# Patient Record
Sex: FEMALE | HISPANIC OR LATINO | URBAN - METROPOLITAN AREA
[De-identification: names, ages, dates, MRNs, and addresses within clinical notes are randomized per-mention and may not be internally consistent; named-entity substitution may affect disease eponyms.]

---
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
## 2018-03-26 ENCOUNTER — EMERGENCY (EMERGENCY)
Facility: HOSPITAL | Age: 18
LOS: 0 days | Discharge: ANOTHER HOSPITAL | End: 2018-03-26
Attending: EMERGENCY MEDICINE | Admitting: EMERGENCY MEDICINE
Payer: MEDICAID

## 2018-03-26 VITALS
RESPIRATION RATE: 18 BRPM | DIASTOLIC BLOOD PRESSURE: 66 MMHG | WEIGHT: 110.01 LBS | OXYGEN SATURATION: 99 % | HEART RATE: 80 BPM | TEMPERATURE: 99 F | HEIGHT: 65 IN | SYSTOLIC BLOOD PRESSURE: 111 MMHG

## 2018-03-26 VITALS
HEART RATE: 79 BPM | OXYGEN SATURATION: 100 % | RESPIRATION RATE: 19 BRPM | TEMPERATURE: 98 F | SYSTOLIC BLOOD PRESSURE: 111 MMHG | DIASTOLIC BLOOD PRESSURE: 65 MMHG

## 2018-03-26 DIAGNOSIS — R69 ILLNESS, UNSPECIFIED: ICD-10-CM

## 2018-03-26 DIAGNOSIS — R45.851 SUICIDAL IDEATIONS: ICD-10-CM

## 2018-03-26 DIAGNOSIS — F33.2 MAJOR DEPRESSIVE DISORDER, RECURRENT SEVERE WITHOUT PSYCHOTIC FEATURES: ICD-10-CM

## 2018-03-26 DIAGNOSIS — F43.10 POST-TRAUMATIC STRESS DISORDER, UNSPECIFIED: ICD-10-CM

## 2018-03-26 LAB
ALBUMIN SERPL ELPH-MCNC: 4 G/DL — SIGNIFICANT CHANGE UP (ref 3.3–5)
ALP SERPL-CCNC: 80 U/L — SIGNIFICANT CHANGE UP (ref 40–120)
ALT FLD-CCNC: 19 U/L — SIGNIFICANT CHANGE UP (ref 12–78)
AMPHET UR-MCNC: NEGATIVE — SIGNIFICANT CHANGE UP
ANION GAP SERPL CALC-SCNC: 8 MMOL/L — SIGNIFICANT CHANGE UP (ref 5–17)
APAP SERPL-MCNC: < 2 UG/ML (ref 10–30)
APPEARANCE UR: CLEAR — SIGNIFICANT CHANGE UP
AST SERPL-CCNC: 14 U/L — LOW (ref 15–37)
BACTERIA # UR AUTO: (no result)
BARBITURATES UR SCN-MCNC: NEGATIVE — SIGNIFICANT CHANGE UP
BASOPHILS # BLD AUTO: 0.03 K/UL — SIGNIFICANT CHANGE UP (ref 0–0.2)
BASOPHILS NFR BLD AUTO: 0.4 % — SIGNIFICANT CHANGE UP (ref 0–2)
BENZODIAZ UR-MCNC: NEGATIVE — SIGNIFICANT CHANGE UP
BILIRUB SERPL-MCNC: 0.4 MG/DL — SIGNIFICANT CHANGE UP (ref 0.2–1.2)
BILIRUB UR-MCNC: NEGATIVE — SIGNIFICANT CHANGE UP
BUN SERPL-MCNC: 9 MG/DL — SIGNIFICANT CHANGE UP (ref 7–23)
CALCIUM SERPL-MCNC: 9 MG/DL — SIGNIFICANT CHANGE UP (ref 8.5–10.1)
CHLORIDE SERPL-SCNC: 106 MMOL/L — SIGNIFICANT CHANGE UP (ref 96–108)
CO2 SERPL-SCNC: 26 MMOL/L — SIGNIFICANT CHANGE UP (ref 22–31)
COCAINE METAB.OTHER UR-MCNC: NEGATIVE — SIGNIFICANT CHANGE UP
COLOR SPEC: YELLOW — SIGNIFICANT CHANGE UP
CREAT SERPL-MCNC: 0.67 MG/DL — SIGNIFICANT CHANGE UP (ref 0.5–1.3)
DIFF PNL FLD: (no result)
EOSINOPHIL # BLD AUTO: 0.07 K/UL — SIGNIFICANT CHANGE UP (ref 0–0.5)
EOSINOPHIL NFR BLD AUTO: 0.9 % — SIGNIFICANT CHANGE UP (ref 0–6)
EPI CELLS # UR: (no result)
ETHANOL SERPL-MCNC: <10 MG/DL — SIGNIFICANT CHANGE UP (ref 0–10)
GLUCOSE SERPL-MCNC: 83 MG/DL — SIGNIFICANT CHANGE UP (ref 70–99)
GLUCOSE UR QL: NEGATIVE MG/DL — SIGNIFICANT CHANGE UP
HCT VFR BLD CALC: 39 % — SIGNIFICANT CHANGE UP (ref 34.5–45)
HGB BLD-MCNC: 12.9 G/DL — SIGNIFICANT CHANGE UP (ref 11.5–15.5)
IMM GRANULOCYTES NFR BLD AUTO: 0.4 % — SIGNIFICANT CHANGE UP (ref 0–1.5)
KETONES UR-MCNC: NEGATIVE — SIGNIFICANT CHANGE UP
LEUKOCYTE ESTERASE UR-ACNC: (no result)
LYMPHOCYTES # BLD AUTO: 2.49 K/UL — SIGNIFICANT CHANGE UP (ref 1–3.3)
LYMPHOCYTES # BLD AUTO: 33.2 % — SIGNIFICANT CHANGE UP (ref 13–44)
MCHC RBC-ENTMCNC: 29.2 PG — SIGNIFICANT CHANGE UP (ref 27–34)
MCHC RBC-ENTMCNC: 33.1 GM/DL — SIGNIFICANT CHANGE UP (ref 32–36)
MCV RBC AUTO: 88.2 FL — SIGNIFICANT CHANGE UP (ref 80–100)
METHADONE UR-MCNC: NEGATIVE — SIGNIFICANT CHANGE UP
MONOCYTES # BLD AUTO: 0.4 K/UL — SIGNIFICANT CHANGE UP (ref 0–0.9)
MONOCYTES NFR BLD AUTO: 5.3 % — SIGNIFICANT CHANGE UP (ref 2–14)
NEUTROPHILS # BLD AUTO: 4.49 K/UL — SIGNIFICANT CHANGE UP (ref 1.8–7.4)
NEUTROPHILS NFR BLD AUTO: 59.8 % — SIGNIFICANT CHANGE UP (ref 43–77)
NITRITE UR-MCNC: NEGATIVE — SIGNIFICANT CHANGE UP
NRBC # BLD: 0 /100 WBCS — SIGNIFICANT CHANGE UP (ref 0–0)
OPIATES UR-MCNC: NEGATIVE — SIGNIFICANT CHANGE UP
PCP SPEC-MCNC: SIGNIFICANT CHANGE UP
PCP UR-MCNC: NEGATIVE — SIGNIFICANT CHANGE UP
PH UR: 6 — SIGNIFICANT CHANGE UP (ref 5–8)
PLATELET # BLD AUTO: 226 K/UL — SIGNIFICANT CHANGE UP (ref 150–400)
POTASSIUM SERPL-MCNC: 3.6 MMOL/L — SIGNIFICANT CHANGE UP (ref 3.5–5.3)
POTASSIUM SERPL-SCNC: 3.6 MMOL/L — SIGNIFICANT CHANGE UP (ref 3.5–5.3)
PROT SERPL-MCNC: 7.9 GM/DL — SIGNIFICANT CHANGE UP (ref 6–8.3)
PROT UR-MCNC: NEGATIVE MG/DL — SIGNIFICANT CHANGE UP
RBC # BLD: 4.42 M/UL — SIGNIFICANT CHANGE UP (ref 3.8–5.2)
RBC # FLD: 13.2 % — SIGNIFICANT CHANGE UP (ref 10.3–14.5)
RBC CASTS # UR COMP ASSIST: (no result) /HPF (ref 0–4)
SALICYLATES SERPL-MCNC: <1.7 MG/DL — LOW (ref 2.8–20)
SODIUM SERPL-SCNC: 140 MMOL/L — SIGNIFICANT CHANGE UP (ref 135–145)
SP GR SPEC: 1.01 — SIGNIFICANT CHANGE UP (ref 1.01–1.02)
THC UR QL: NEGATIVE — SIGNIFICANT CHANGE UP
UROBILINOGEN FLD QL: NEGATIVE MG/DL — SIGNIFICANT CHANGE UP
WBC # BLD: 7.51 K/UL — SIGNIFICANT CHANGE UP (ref 3.8–10.5)
WBC # FLD AUTO: 7.51 K/UL — SIGNIFICANT CHANGE UP (ref 3.8–10.5)
WBC UR QL: SIGNIFICANT CHANGE UP

## 2018-03-26 PROCEDURE — 93010 ELECTROCARDIOGRAM REPORT: CPT

## 2018-03-26 PROCEDURE — 99285 EMERGENCY DEPT VISIT HI MDM: CPT
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
## 2018-03-26 NOTE — ED BEHAVIORAL HEALTH ASSESSMENT NOTE - RISK ASSESSMENT
Patient presents a high risk to self, with risk factors including poor judgement, history of alcohol and marijuana use, history of suicidal attempt via overdose (of high lethality, requiring medical attention), strained family relationships, depressive symptoms and PTSD (prior trauma), of which protective factors of supportive family is not sufficient barriers to patient's self harm, summating patient as a significant risk for harm requiring in-patient hospitalization for safety and stabilization.
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
## 2018-03-26 NOTE — ED PROVIDER NOTE - OBJECTIVE STATEMENT
16 y/o F with no PMHx presents to the ED regarding SI and plan (go to train station and jump in front of train) today. Denies HI or hallucinations. States she has no medications and no psychiatrist. Denies any pain or any other acute complaints at this time.
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
## 2018-03-26 NOTE — ED BEHAVIORAL HEALTH ASSESSMENT NOTE - DESCRIPTION
As per HPI Patient was calm and cooperative in the ED and did not exhibit any aggression. Patient did not require any PRN medications or any physical restraints.     Vital Signs Last 24 Hrs  T(C): 37.1 (26 Mar 2018 13:15), Max: 37.1 (26 Mar 2018 13:15)  T(F): 98.7 (26 Mar 2018 13:15), Max: 98.7 (26 Mar 2018 13:15)  HR: 80 (26 Mar 2018 13:15) (80 - 80)  BP: 111/66 (26 Mar 2018 13:15) (111/66 - 111/66)  BP(mean): --  RR: 18 (26 Mar 2018 13:15) (18 - 18)  SpO2: 99% (26 Mar 2018 13:15) (99% - 99%)

## 2018-03-26 NOTE — ED BEHAVIORAL HEALTH ASSESSMENT NOTE - PRIMARY DX
Severe episode of recurrent major depressive disorder, without psychotic features Axis II diagnosis deferred

## 2018-03-26 NOTE — ED PROVIDER NOTE - PROGRESS NOTE DETAILS
Flor AGUILERA: Received s/o from Dr. Watson; patient to be transferred to [facility] at this time- accepted per psych NP.

## 2018-03-26 NOTE — ED PROVIDER NOTE - MUSCULOSKELETAL, MLM
Spine appears normal, range of motion is not limited, no muscle or joint tenderness. Compartment soft.

## 2018-03-26 NOTE — ED ADULT NURSE NOTE - OBJECTIVE STATEMENT
pt brought by police from [employer] [place]. pt went to school psychologist and states she will throw her self in front of a train.on arrival pt is being cooperative. pt states she is lesbian lives with mother brother and step father. pt feels safe at home but feels her mother can not accept her being Lesbian. pt states she need to work few times a week and she can not take it anymore.h/o suicidal attempt  last year cutting her wrist .

## 2018-05-24 ENCOUNTER — EMERGENCY (EMERGENCY)
Facility: HOSPITAL | Age: 18
LOS: 0 days | Discharge: HOME | End: 2018-05-24
Attending: EMERGENCY MEDICINE | Admitting: EMERGENCY MEDICINE
Payer: MEDICAID

## 2018-05-24 VITALS
DIASTOLIC BLOOD PRESSURE: 64 MMHG | TEMPERATURE: 98 F | OXYGEN SATURATION: 100 % | RESPIRATION RATE: 16 BRPM | SYSTOLIC BLOOD PRESSURE: 110 MMHG | HEART RATE: 75 BPM

## 2018-05-24 VITALS — WEIGHT: 113.98 LBS

## 2018-05-24 LAB — S PYO AG SPEC QL IA: NEGATIVE — SIGNIFICANT CHANGE UP

## 2018-05-24 PROCEDURE — 99283 EMERGENCY DEPT VISIT LOW MDM: CPT

## 2018-05-24 RX ORDER — DEXAMETHASONE 0.5 MG/5ML
6 ELIXIR ORAL ONCE
Qty: 0 | Refills: 0 | Status: COMPLETED | OUTPATIENT
Start: 2018-05-24 | End: 2018-05-24

## 2018-05-24 RX ADMIN — Medication 6 MILLIGRAM(S): at 13:07

## 2018-05-24 NOTE — ED STATDOCS - ATTENDING CONTRIBUTION TO CARE
I, Nathan Gallegos, performed the initial face to face bedside interview with this patient regarding history of present illness, review of symptoms and relevant past medical, social and family history.  I completed an independent physical examination.  I was the initial provider who evaluated this patient. I have signed out the follow up of any pending tests (i.e. labs, radiological studies) to the ACP.  I have communicated the patient’s plan of care and disposition with the ACP.  The history, relevant review of systems, past medical and surgical history, medical decision making, and physical examination was documented by the scribe in my presence and I attest to the accuracy of the documentation.

## 2018-05-24 NOTE — ED STATDOCS - OBJECTIVE STATEMENT
18 y/o F w/ no pmhx, presents to ED c/o sore throat x1 month. +Cough. Denies fever, ear pain, N/V/D, or any other acute complaints. Sometimes smoker.

## 2018-05-24 NOTE — ED STATDOCS - ENMT, MLM
Nasal mucosa clear.  Mouth with normal mucosa  posterior oropharynx right sided erythema, uvula is midline. bilateral tonsillar swelling. No trismus or drooling. TTP anterior cervical lymph nodes bilaterally.

## 2018-05-24 NOTE — ED STATDOCS - PROGRESS NOTE DETAILS
17 yr. old female PMH: Denied presents to ED with sore throat and cough onset 1 month ago. No fever or chills. No ear pain. No N/V/D. Seen and examined by attending in intake. Plan: Throat culture and Decadron. Will f/U with results and re evaluate. Irina SAMPSON

## 2018-05-25 DIAGNOSIS — J02.9 ACUTE PHARYNGITIS, UNSPECIFIED: ICD-10-CM

## 2018-05-25 DIAGNOSIS — F17.200 NICOTINE DEPENDENCE, UNSPECIFIED, UNCOMPLICATED: ICD-10-CM

## 2018-05-25 DIAGNOSIS — R06.02 SHORTNESS OF BREATH: ICD-10-CM

## 2018-05-25 DIAGNOSIS — R05 COUGH: ICD-10-CM

## 2018-05-26 LAB
CULTURE RESULTS: SIGNIFICANT CHANGE UP
SPECIMEN SOURCE: SIGNIFICANT CHANGE UP

## 2018-09-22 NOTE — ED ADULT NURSE NOTE - CHPI ED SYMPTOMS NEG
Dear Jackie;    I saw Adrienne 
no drainage/no discharge/no blurred vision/no double vision
22-Sep-2018

## 2020-01-04 ENCOUNTER — EMERGENCY (EMERGENCY)
Facility: HOSPITAL | Age: 20
LOS: 1 days | Discharge: HOME | End: 2020-01-04
Attending: EMERGENCY MEDICINE
Payer: MEDICAID

## 2020-01-04 VITALS
OXYGEN SATURATION: 98 % | DIASTOLIC BLOOD PRESSURE: 66 MMHG | HEART RATE: 70 BPM | RESPIRATION RATE: 18 BRPM | SYSTOLIC BLOOD PRESSURE: 112 MMHG

## 2020-01-04 VITALS — HEIGHT: 62 IN | WEIGHT: 115.08 LBS

## 2020-01-04 DIAGNOSIS — M54.9 DORSALGIA, UNSPECIFIED: ICD-10-CM

## 2020-01-04 DIAGNOSIS — M54.2 CERVICALGIA: ICD-10-CM

## 2020-01-04 DIAGNOSIS — S70.212A ABRASION, LEFT HIP, INITIAL ENCOUNTER: ICD-10-CM
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
## 2020-01-04 DIAGNOSIS — R10.31 RIGHT LOWER QUADRANT PAIN: ICD-10-CM
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
## 2020-01-04 DIAGNOSIS — S39.91XA UNSPECIFIED INJURY OF ABDOMEN, INITIAL ENCOUNTER: ICD-10-CM

## 2020-01-04 LAB
ALBUMIN SERPL ELPH-MCNC: 3.9 G/DL — SIGNIFICANT CHANGE UP (ref 3.3–5)
ALP SERPL-CCNC: 76 U/L — SIGNIFICANT CHANGE UP (ref 40–120)
ALT FLD-CCNC: 17 U/L — SIGNIFICANT CHANGE UP (ref 12–78)
ANION GAP SERPL CALC-SCNC: 4 MMOL/L — LOW (ref 5–17)
APPEARANCE UR: CLEAR — SIGNIFICANT CHANGE UP
AST SERPL-CCNC: 14 U/L — LOW (ref 15–37)
BASOPHILS # BLD AUTO: 0.04 K/UL — SIGNIFICANT CHANGE UP (ref 0–0.2)
BASOPHILS NFR BLD AUTO: 0.4 % — SIGNIFICANT CHANGE UP (ref 0–2)
BILIRUB SERPL-MCNC: 0.5 MG/DL — SIGNIFICANT CHANGE UP (ref 0.2–1.2)
BILIRUB UR-MCNC: NEGATIVE — SIGNIFICANT CHANGE UP
BUN SERPL-MCNC: 15 MG/DL — SIGNIFICANT CHANGE UP (ref 7–23)
CALCIUM SERPL-MCNC: 8.7 MG/DL — SIGNIFICANT CHANGE UP (ref 8.5–10.1)
CHLORIDE SERPL-SCNC: 106 MMOL/L — SIGNIFICANT CHANGE UP (ref 96–108)
CO2 SERPL-SCNC: 28 MMOL/L — SIGNIFICANT CHANGE UP (ref 22–31)
COLOR SPEC: YELLOW — SIGNIFICANT CHANGE UP
CREAT SERPL-MCNC: 1.09 MG/DL — SIGNIFICANT CHANGE UP (ref 0.5–1.3)
DIFF PNL FLD: NEGATIVE — SIGNIFICANT CHANGE UP
EOSINOPHIL # BLD AUTO: 0.06 K/UL — SIGNIFICANT CHANGE UP (ref 0–0.5)
EOSINOPHIL NFR BLD AUTO: 0.6 % — SIGNIFICANT CHANGE UP (ref 0–6)
GLUCOSE SERPL-MCNC: 91 MG/DL — SIGNIFICANT CHANGE UP (ref 70–99)
GLUCOSE UR QL: NEGATIVE MG/DL — SIGNIFICANT CHANGE UP
HCT VFR BLD CALC: 39.6 % — SIGNIFICANT CHANGE UP (ref 34.5–45)
HGB BLD-MCNC: 13.3 G/DL — SIGNIFICANT CHANGE UP (ref 11.5–15.5)
IMM GRANULOCYTES NFR BLD AUTO: 0.3 % — SIGNIFICANT CHANGE UP (ref 0–1.5)
KETONES UR-MCNC: NEGATIVE — SIGNIFICANT CHANGE UP
LEUKOCYTE ESTERASE UR-ACNC: NEGATIVE — SIGNIFICANT CHANGE UP
LIDOCAIN IGE QN: 122 U/L — SIGNIFICANT CHANGE UP (ref 73–393)
LYMPHOCYTES # BLD AUTO: 29 % — SIGNIFICANT CHANGE UP (ref 13–44)
LYMPHOCYTES # BLD AUTO: 3.16 K/UL — SIGNIFICANT CHANGE UP (ref 1–3.3)
MCHC RBC-ENTMCNC: 29.8 PG — SIGNIFICANT CHANGE UP (ref 27–34)
MCHC RBC-ENTMCNC: 33.6 GM/DL — SIGNIFICANT CHANGE UP (ref 32–36)
MCV RBC AUTO: 88.6 FL — SIGNIFICANT CHANGE UP (ref 80–100)
MONOCYTES # BLD AUTO: 0.63 K/UL — SIGNIFICANT CHANGE UP (ref 0–0.9)
MONOCYTES NFR BLD AUTO: 5.8 % — SIGNIFICANT CHANGE UP (ref 2–14)
NEUTROPHILS # BLD AUTO: 6.97 K/UL — SIGNIFICANT CHANGE UP (ref 1.8–7.4)
NEUTROPHILS NFR BLD AUTO: 63.9 % — SIGNIFICANT CHANGE UP (ref 43–77)
NITRITE UR-MCNC: NEGATIVE — SIGNIFICANT CHANGE UP
PH UR: 8 — SIGNIFICANT CHANGE UP (ref 5–8)
PLATELET # BLD AUTO: 227 K/UL — SIGNIFICANT CHANGE UP (ref 150–400)
POTASSIUM SERPL-MCNC: 3.9 MMOL/L — SIGNIFICANT CHANGE UP (ref 3.5–5.3)
POTASSIUM SERPL-SCNC: 3.9 MMOL/L — SIGNIFICANT CHANGE UP (ref 3.5–5.3)
PROT SERPL-MCNC: 7.8 GM/DL — SIGNIFICANT CHANGE UP (ref 6–8.3)
PROT UR-MCNC: NEGATIVE MG/DL — SIGNIFICANT CHANGE UP
RBC # BLD: 4.47 M/UL — SIGNIFICANT CHANGE UP (ref 3.8–5.2)
RBC # FLD: 12.5 % — SIGNIFICANT CHANGE UP (ref 10.3–14.5)
SODIUM SERPL-SCNC: 138 MMOL/L — SIGNIFICANT CHANGE UP (ref 135–145)
SP GR SPEC: 1.01 — SIGNIFICANT CHANGE UP (ref 1.01–1.02)
UROBILINOGEN FLD QL: NEGATIVE MG/DL — SIGNIFICANT CHANGE UP
WBC # BLD: 10.89 K/UL — HIGH (ref 3.8–10.5)
WBC # FLD AUTO: 10.89 K/UL — HIGH (ref 3.8–10.5)

## 2020-01-04 PROCEDURE — 36415 COLL VENOUS BLD VENIPUNCTURE: CPT

## 2020-01-04 PROCEDURE — 71045 X-RAY EXAM CHEST 1 VIEW: CPT

## 2020-01-04 PROCEDURE — 81003 URINALYSIS AUTO W/O SCOPE: CPT

## 2020-01-04 PROCEDURE — 83690 ASSAY OF LIPASE: CPT

## 2020-01-04 PROCEDURE — 74177 CT ABD & PELVIS W/CONTRAST: CPT | Mod: 26

## 2020-01-04 PROCEDURE — 85025 COMPLETE CBC W/AUTO DIFF WBC: CPT

## 2020-01-04 PROCEDURE — 80053 COMPREHEN METABOLIC PANEL: CPT

## 2020-01-04 PROCEDURE — 99284 EMERGENCY DEPT VISIT MOD MDM: CPT

## 2020-01-04 PROCEDURE — 99053 MED SERV 10PM-8AM 24 HR FAC: CPT

## 2020-01-04 PROCEDURE — 74177 CT ABD & PELVIS W/CONTRAST: CPT

## 2020-01-04 PROCEDURE — 71045 X-RAY EXAM CHEST 1 VIEW: CPT | Mod: 26

## 2020-01-04 PROCEDURE — 99284 EMERGENCY DEPT VISIT MOD MDM: CPT | Mod: 25

## 2020-01-04 RX ORDER — ACETAMINOPHEN 500 MG
975 TABLET ORAL ONCE
Refills: 0 | Status: COMPLETED | OUTPATIENT
Start: 2020-01-04 | End: 2020-01-04

## 2020-01-04 RX ADMIN — Medication 975 MILLIGRAM(S): at 19:35

## 2020-01-04 RX ADMIN — Medication 975 MILLIGRAM(S): at 19:10
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
## 2020-01-04 NOTE — ED STATDOCS - PATIENT PORTAL LINK FT
You can access the FollowMyHealth Patient Portal offered by [facility] by registering at the following website: [email or web address]. By joining [employer]’s FollowMyHealth portal, you will also be able to view your health information using other applications (apps) compatible with our system.

## 2020-01-04 NOTE — ED STATDOCS - ATTENDING CONTRIBUTION TO CARE
Initial history and physical performed by attending, plan of care discussed with ACP and managed under my supervision.
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
## 2020-01-04 NOTE — ED ADULT NURSE NOTE - NSIMPLEMENTINTERV_GEN_ALL_ED
Implemented All Universal Safety Interventions:  [place] to call system. Call bell, personal items and telephone within reach. Instruct patient to call for assistance. Room bathroom lighting operational. Non-slip footwear when patient is off stretcher. Physically safe environment: no spills, clutter or unnecessary equipment. Stretcher in lowest position, wheels locked, appropriate side rails in place.

## 2020-02-07 ENCOUNTER — EMERGENCY (EMERGENCY)
Facility: HOSPITAL | Age: 20
LOS: 0 days | Discharge: HOME | End: 2020-02-07
Attending: STUDENT IN AN ORGANIZED HEALTH CARE EDUCATION/TRAINING PROGRAM
Payer: MEDICAID

## 2020-02-07 VITALS
DIASTOLIC BLOOD PRESSURE: 65 MMHG | TEMPERATURE: 100 F | RESPIRATION RATE: 18 BRPM | OXYGEN SATURATION: 99 % | SYSTOLIC BLOOD PRESSURE: 117 MMHG | HEART RATE: 87 BPM

## 2020-02-07 VITALS — WEIGHT: 111.99 LBS | HEIGHT: 60 IN

## 2020-02-07 DIAGNOSIS — J11.1 INFLUENZA DUE TO UNIDENTIFIED INFLUENZA VIRUS WITH OTHER RESPIRATORY MANIFESTATIONS: ICD-10-CM

## 2020-02-07 DIAGNOSIS — R50.9 FEVER, UNSPECIFIED: ICD-10-CM

## 2020-02-07 DIAGNOSIS — R05 COUGH: ICD-10-CM

## 2020-02-07 LAB
FLU A RESULT: SIGNIFICANT CHANGE UP
FLU A RESULT: SIGNIFICANT CHANGE UP
FLUAV AG NPH QL: SIGNIFICANT CHANGE UP
FLUBV AG NPH QL: DETECTED
RSV RESULT: SIGNIFICANT CHANGE UP
RSV RNA RESP QL NAA+PROBE: SIGNIFICANT CHANGE UP

## 2020-02-07 PROCEDURE — 81025 URINE PREGNANCY TEST: CPT

## 2020-02-07 PROCEDURE — 87631 RESP VIRUS 3-5 TARGETS: CPT

## 2020-02-07 PROCEDURE — 96374 THER/PROPH/DIAG INJ IV PUSH: CPT

## 2020-02-07 PROCEDURE — 99284 EMERGENCY DEPT VISIT MOD MDM: CPT | Mod: 25

## 2020-02-07 PROCEDURE — 96361 HYDRATE IV INFUSION ADD-ON: CPT

## 2020-02-07 PROCEDURE — 99284 EMERGENCY DEPT VISIT MOD MDM: CPT

## 2020-02-07 RX ORDER — SODIUM CHLORIDE 9 MG/ML
2000 INJECTION INTRAMUSCULAR; INTRAVENOUS; SUBCUTANEOUS ONCE
Refills: 0 | Status: COMPLETED | OUTPATIENT
Start: 2020-02-07 | End: 2020-02-07

## 2020-02-07 RX ORDER — ACETAMINOPHEN 500 MG
650 TABLET ORAL ONCE
Refills: 0 | Status: COMPLETED | OUTPATIENT
Start: 2020-02-07 | End: 2020-02-07

## 2020-02-07 RX ORDER — KETOROLAC TROMETHAMINE 30 MG/ML
30 SYRINGE (ML) INJECTION ONCE
Refills: 0 | Status: DISCONTINUED | OUTPATIENT
Start: 2020-02-07 | End: 2020-02-07

## 2020-02-07 RX ORDER — IBUPROFEN 200 MG
1 TABLET ORAL
Qty: 12 | Refills: 0
Start: 2020-02-07 | End: 2020-02-09

## 2020-02-07 RX ADMIN — SODIUM CHLORIDE 2000 MILLILITER(S): 9 INJECTION INTRAMUSCULAR; INTRAVENOUS; SUBCUTANEOUS at 10:30

## 2020-02-07 RX ADMIN — Medication 650 MILLIGRAM(S): at 11:14

## 2020-02-07 RX ADMIN — Medication 30 MILLIGRAM(S): at 11:14

## 2020-02-07 RX ADMIN — Medication 75 MILLIGRAM(S): at 11:20

## 2020-02-07 RX ADMIN — Medication 30 MILLIGRAM(S): at 09:58

## 2020-02-07 RX ADMIN — SODIUM CHLORIDE 2000 MILLILITER(S): 9 INJECTION INTRAMUSCULAR; INTRAVENOUS; SUBCUTANEOUS at 09:50

## 2020-02-07 RX ADMIN — Medication 650 MILLIGRAM(S): at 09:45

## 2020-02-07 NOTE — ED PROVIDER NOTE - PATIENT PORTAL LINK FT
You can access the FollowMyHealth Patient Portal offered by [facility] by registering at the following website: [email or web address]. By joining [facility] [facility]’s FollowMyHealth portal, you will also be able to view your health information using other applications (apps) compatible with our system.

## 2020-02-07 NOTE — ED PROVIDER NOTE - OBJECTIVE STATEMENT
20 y/o female with no significant PMHx presents to the ED c/o flu like symptoms. Pt reports she developed a fever last night. +body aches, +throat pain, +cough, +fever. Denies HA. Did not get her flu vaccine this year. No other complaints at this time.

## 2020-02-07 NOTE — ED ADULT TRIAGE NOTE - CHIEF COMPLAINT QUOTE
Pt. to the ED CO Fever and Generalized body aches with sore throat since last night- denies major medical hx and or recent ravel or close contacts-- Pt. reports taking a medication given by a family member but does not the name

## 2020-02-07 NOTE — ED PROVIDER NOTE - PROGRESS NOTE DETAILS
Flor DO: Patient feeling better at this time; repeat HR in 80s; flu positive; rx for tamiflu; f/u with pmd in 1-2 days without fail

## 2020-02-26 ENCOUNTER — EMERGENCY (EMERGENCY)
Facility: HOSPITAL | Age: 20
LOS: 0 days | Discharge: HOME | End: 2020-02-26
Attending: EMERGENCY MEDICINE
Payer: MEDICAID

## 2020-02-26 VITALS
HEIGHT: 63 IN | TEMPERATURE: 98 F | OXYGEN SATURATION: 97 % | RESPIRATION RATE: 16 BRPM | DIASTOLIC BLOOD PRESSURE: 70 MMHG | HEART RATE: 73 BPM | SYSTOLIC BLOOD PRESSURE: 105 MMHG | WEIGHT: 125 LBS

## 2020-02-26 DIAGNOSIS — R30.0 DYSURIA: ICD-10-CM

## 2020-02-26 DIAGNOSIS — N30.01 ACUTE CYSTITIS WITH HEMATURIA: ICD-10-CM

## 2020-02-26 DIAGNOSIS — R31.9 HEMATURIA, UNSPECIFIED: ICD-10-CM

## 2020-02-26 LAB
APPEARANCE UR: ABNORMAL
BILIRUB UR-MCNC: NEGATIVE — SIGNIFICANT CHANGE UP
COLOR SPEC: YELLOW — SIGNIFICANT CHANGE UP
DIFF PNL FLD: ABNORMAL
GLUCOSE UR QL: NEGATIVE MG/DL — SIGNIFICANT CHANGE UP
KETONES UR-MCNC: ABNORMAL
LEUKOCYTE ESTERASE UR-ACNC: ABNORMAL
NITRITE UR-MCNC: NEGATIVE — SIGNIFICANT CHANGE UP
PH UR: 6 — SIGNIFICANT CHANGE UP (ref 5–8)
PROT UR-MCNC: 100 MG/DL
SP GR SPEC: 1.01 — SIGNIFICANT CHANGE UP (ref 1.01–1.02)
UROBILINOGEN FLD QL: NEGATIVE MG/DL — SIGNIFICANT CHANGE UP

## 2020-02-26 PROCEDURE — 81025 URINE PREGNANCY TEST: CPT

## 2020-02-26 PROCEDURE — 87086 URINE CULTURE/COLONY COUNT: CPT

## 2020-02-26 PROCEDURE — 99283 EMERGENCY DEPT VISIT LOW MDM: CPT

## 2020-02-26 PROCEDURE — 81001 URINALYSIS AUTO W/SCOPE: CPT

## 2020-02-26 PROCEDURE — 87186 SC STD MICRODIL/AGAR DIL: CPT

## 2020-02-26 PROCEDURE — 87591 N.GONORRHOEAE DNA AMP PROB: CPT

## 2020-02-26 RX ORDER — CEPHALEXIN 500 MG
1 CAPSULE ORAL
Qty: 14 | Refills: 0
Start: 2020-02-26 | End: 2020-03-03

## 2020-02-26 NOTE — ED STATDOCS - NSFOLLOWUPINSTRUCTIONS_ED_ALL_ED_FT
Infección urinaria en los adultos  Urinary Tract Infection, Adult  Juan infección urinaria (IU) puede ocurrir en cualquier lugar de las vías urinarias. Las vías urinarias incluyen lo siguiente:  Los riñones.Los uréteres.La vejiga.La uretra.Estos órganos fabrican, almacenan y eliminan el pis (orina) del cuerpo.  ¿Cuáles son las causas?  La causa es la presencia de gérmenes (bacterias) en la herb genital. Estos gérmenes proliferan y causan hinchazón (inflamación) de las vías urinarias.  ¿Qué incrementa el riesgo?  Es más probable que contraiga esta afección si:  Tiene colocado un tubo horton y pequeño (catéter) para drenar el pis.No puede controlar la evacuación de pis ni de materia fecal (incontinencia).Es dena y, además:  Usa estos métodos para evitar el embarazo:  Un medicamento que vasquez los espermatozoides (espermicida).Un dispositivo que impide el paso de los espermatozoides (diafragma).Tiene niveles bajos de juan hormona femenina (estrógeno).Está embarazada.Tiene genes que aumentan seth riesgo.Es sexualmente activa.Rosalina antibióticos. Tiene dificultad para orinar debido a:  Seth próstata es más mauricio de lo normal, si usted es hombre.Obstrucción en la parte del cuerpo que drena el pis de la vejiga (uretra).Cálculo renal. Un trastorno nervioso que afecta la vejiga (vejiga neurógena).No amelie juan cantidad suficiente de líquido. No hace pis con la frecuencia suficiente.Tiene otras afecciones, loren:  Diabetes. Un sistema que combate las enfermedades (sistema inmunitario) debilitado.Anemia drepanocítica. Gota. Lesión en la columna vertebral.¿Cuáles son los signos o los síntomas?  Los síntomas de esta afección incluyen:  Necesidad inmediata (urgente) de hacer pis.Hacer pis con frecuencia.Hacer poca cantidad de pis con mucha frecuencia.Dolor o ardor al hacer pis.Juan M en el pis.Pis que huele mal o anormal.Dificultad para hacer pis.Pis turbio.Líquido que sale de la vagina, si es dena.Dolor en la carmelita o en la parte baja de la espalda.Otros síntomas pueden incluir los siguientes:  Vómitos.No sentir deseos de comer.Sentirse confundido (confuso).Sentirse cansado y malhumorado (irritable).Fiebre.Materia fecal líquida (diarrea).¿Cómo se trata?  El tratamiento de esta afección puede incluir:  Antibióticos. Otros medicamentos.[place] juan cantidad suficiente de agua.Sigue estas instrucciones en tu casa:     Medicamentos     [place] los medicamentos de venta jesenia y los recetados solamente loren se lo haya indicado el médico.Si le recetaron un antibiótico, tómelo loren se lo haya indicado el médico. No deje de tomarlo aunque comience a sentirse mejor.Indicaciones generales     Asegúrese de hacer lo siguiente:  Ace pis hasta que la vejiga quede vacía. No contenga el pis richard mucho tiempo.Vacíe la vejiga después de tener relaciones sexuales.Límpiese de adelante hacia atrás después de defecar, si es dena. Use cada trozo de papel higiénico solo juan vez cuando se limpie.Serena suficiente líquido loren para mantener la orina de color amarillo pálido.Concurra a todas las visitas de seguimiento loren se lo haya indicado el médico. [place] es importante.Comuníquese con un médico si:  No mejora después de 1 o 2 días de tratamiento.Los síntomas desaparecen y luego reaparecen.Solicite ayuda inmediatamente si:  Tiene un dolor muy intenso en la espalda.Tiene dolor muy intenso en la parte baja de la carmelita.Tener fiebre.Tiene malestar estomacal (náuseas).Tiene vómitos.Resumen  Juan infección urinaria (IU) puede ocurrir en cualquier lugar de las vías urinarias.Esta afección es causada por la presencia de gérmenes en la herb genital.Existen muchos factores de riesgo de sufrir juan IU. Estos incluyen tener colocado un tubo horton y pequeño para drenar el pis y no poder controlar cuándo hace pis y materia fecal.El tratamiento incluye antibióticos contra los gérmenes.Serena suficiente líquido loren para mantener la orina de color amarillo pálido.Esta información no tiene loren fin reemplazar el consejo del médico. Asegúrese de hacerle al médico cualquier pregunta que tenga.

## 2020-02-26 NOTE — ED ADULT TRIAGE NOTE - CHIEF COMPLAINT QUOTE
pt c/o intermittent urinary syptoms x 1 month, last night urinary burning and small amount of blood noted in urine

## 2020-02-26 NOTE — ED STATDOCS - PMH
Message noted.  I would like Mr SEPULVEDA to move-up his appt time. Since I have limited new patient appointment slots in next few weeks, I recommend using one of my Administrative slots (12:30-1:30pm) at [facility]... Use a Tuesday (his best day) such as 1/30 or 2/6 or 2/13/18.  Please arrange, thanks.  TL       No pertinent past medical history

## 2020-02-26 NOTE — ED ADULT NURSE NOTE - OBJECTIVE STATEMENT
Pt c/o lower abd pain burning on urination started one month ago on and off. no fever, no chills.denies v/d. LMP 2/19/20.HEMATURIA.

## 2020-02-26 NOTE — ED STATDOCS - OBJECTIVE STATEMENT
20 y/o female with no significant PMHx presents to the ED c/o intermittent urinary symptoms x1 month. Pt reports she had burning with urination last night with a small amount of blood. Denies n/v/d, fevers, chills. Pt only sexually active with . No other complaints at this time.

## 2020-02-26 NOTE — ED STATDOCS - PROGRESS NOTE DETAILS
18 y/o female with no significant PMHx presents to the ED c/o intermittent urinary symptoms x1 month. Pt reports she had burning with urination last night with a small amount of blood. Denies n/v/d, fevers, chills. Pt only sexually active with . No other complaints at this time.  PE: mild suprapubic TTP, abd soft, non-tender, non-distended, lungs CTA b/l, heart RRR s1/s2  Plan: UA, urine culture, urine GC   Ioana Arango PA-C UA consistent with UTI, will start abx and d/c home with outpt f/u with PMD, pt advised we will call her with positive STI results.  Plan to d/c home with outpt f/u pt agreeable to d/c and plan of care, all questions answered, return precautions given  Ioana Arango PA-C

## 2020-02-26 NOTE — ED STATDOCS - PATIENT PORTAL LINK FT
You can access the FollowMyHealth Patient Portal offered by [facility] by registering at the following website: [email or web address]. By joining [facility]’s FollowMyHealth portal, you will also be able to view your health information using other applications (apps) compatible with our system.

## 2020-02-26 NOTE — ED STATDOCS - CLINICAL SUMMARY MEDICAL DECISION MAKING FREE TEXT BOX
Pt reports dysuria x1 month. Will evaluate for UTI. No flank pain, tenderness, or fever. Pt monogamous with . Not suspicious for sexually transmitted diseases. Pt reports dysuria x1 month. Will evaluate for UTI. No flank pain or CVAT on exam.   No fever. Pt monogamous with . Not suspicious for sexually transmitted diseases.  UA positive for uti.  Pt well appearing.  Will treat for UTI.  D/c home with strict return precautions and prompt outpatient f/u.

## 2020-02-27 LAB
N GONORRHOEA RRNA SPEC QL NAA+PROBE: SIGNIFICANT CHANGE UP
SPECIMEN SOURCE: SIGNIFICANT CHANGE UP

## 2020-06-08 ENCOUNTER — EMERGENCY (EMERGENCY)
Facility: HOSPITAL | Age: 20
LOS: 0 days | Discharge: HOME | End: 2020-06-08
Attending: EMERGENCY MEDICINE
Payer: MEDICAID

## 2020-06-08 VITALS
DIASTOLIC BLOOD PRESSURE: 61 MMHG | RESPIRATION RATE: 16 BRPM | TEMPERATURE: 98 F | OXYGEN SATURATION: 99 % | HEART RATE: 65 BPM | SYSTOLIC BLOOD PRESSURE: 98 MMHG

## 2020-06-08 VITALS — HEIGHT: 62 IN | WEIGHT: 110.89 LBS

## 2020-06-08 DIAGNOSIS — O23.41 UNSPECIFIED INFECTION OF URINARY TRACT IN PREGNANCY, FIRST TRIMESTER: ICD-10-CM

## 2020-06-08 DIAGNOSIS — Z3A.10 10 WEEKS GESTATION OF PREGNANCY: ICD-10-CM

## 2020-06-08 DIAGNOSIS — R39.15 URGENCY OF URINATION: ICD-10-CM

## 2020-06-08 DIAGNOSIS — O20.8 OTHER HEMORRHAGE IN EARLY PREGNANCY: ICD-10-CM

## 2020-06-08 DIAGNOSIS — O99.89 OTHER SPECIFIED DISEASES AND CONDITIONS COMPLICATING PREGNANCY, CHILDBIRTH AND THE PUERPERIUM: ICD-10-CM

## 2020-06-08 DIAGNOSIS — R35.0 FREQUENCY OF MICTURITION: ICD-10-CM

## 2020-06-08 LAB
ALBUMIN SERPL ELPH-MCNC: 3.4 G/DL — SIGNIFICANT CHANGE UP (ref 3.3–5)
ALP SERPL-CCNC: 62 U/L — SIGNIFICANT CHANGE UP (ref 40–120)
ALT FLD-CCNC: 16 U/L — SIGNIFICANT CHANGE UP (ref 12–78)
ANION GAP SERPL CALC-SCNC: 5 MMOL/L — SIGNIFICANT CHANGE UP (ref 5–17)
APPEARANCE UR: ABNORMAL
AST SERPL-CCNC: 10 U/L — LOW (ref 15–37)
BILIRUB SERPL-MCNC: 0.3 MG/DL — SIGNIFICANT CHANGE UP (ref 0.2–1.2)
BILIRUB UR-MCNC: NEGATIVE — SIGNIFICANT CHANGE UP
BUN SERPL-MCNC: 8 MG/DL — SIGNIFICANT CHANGE UP (ref 7–23)
CALCIUM SERPL-MCNC: 8.9 MG/DL — SIGNIFICANT CHANGE UP (ref 8.5–10.1)
CHLORIDE SERPL-SCNC: 104 MMOL/L — SIGNIFICANT CHANGE UP (ref 96–108)
CO2 SERPL-SCNC: 24 MMOL/L — SIGNIFICANT CHANGE UP (ref 22–31)
COLOR SPEC: YELLOW — SIGNIFICANT CHANGE UP
CREAT SERPL-MCNC: 0.55 MG/DL — SIGNIFICANT CHANGE UP (ref 0.5–1.3)
DIFF PNL FLD: ABNORMAL
GLUCOSE SERPL-MCNC: 84 MG/DL — SIGNIFICANT CHANGE UP (ref 70–99)
GLUCOSE UR QL: NEGATIVE MG/DL — SIGNIFICANT CHANGE UP
HCT VFR BLD CALC: 33.1 % — LOW (ref 34.5–45)
HGB BLD-MCNC: 11.5 G/DL — SIGNIFICANT CHANGE UP (ref 11.5–15.5)
KETONES UR-MCNC: NEGATIVE — SIGNIFICANT CHANGE UP
LEUKOCYTE ESTERASE UR-ACNC: ABNORMAL
MCHC RBC-ENTMCNC: 29.8 PG — SIGNIFICANT CHANGE UP (ref 27–34)
MCHC RBC-ENTMCNC: 34.7 GM/DL — SIGNIFICANT CHANGE UP (ref 32–36)
MCV RBC AUTO: 85.8 FL — SIGNIFICANT CHANGE UP (ref 80–100)
NITRITE UR-MCNC: NEGATIVE — SIGNIFICANT CHANGE UP
PH UR: 6 — SIGNIFICANT CHANGE UP (ref 5–8)
PLATELET # BLD AUTO: 200 K/UL — SIGNIFICANT CHANGE UP (ref 150–400)
POTASSIUM SERPL-MCNC: 3.5 MMOL/L — SIGNIFICANT CHANGE UP (ref 3.5–5.3)
POTASSIUM SERPL-SCNC: 3.5 MMOL/L — SIGNIFICANT CHANGE UP (ref 3.5–5.3)
PROT SERPL-MCNC: 7.3 GM/DL — SIGNIFICANT CHANGE UP (ref 6–8.3)
PROT UR-MCNC: 500 MG/DL
RBC # BLD: 3.86 M/UL — SIGNIFICANT CHANGE UP (ref 3.8–5.2)
RBC # FLD: 13 % — SIGNIFICANT CHANGE UP (ref 10.3–14.5)
SODIUM SERPL-SCNC: 133 MMOL/L — LOW (ref 135–145)
SP GR SPEC: 1.02 — SIGNIFICANT CHANGE UP (ref 1.01–1.02)
UROBILINOGEN FLD QL: NEGATIVE MG/DL — SIGNIFICANT CHANGE UP
WBC # BLD: 12.88 K/UL — HIGH (ref 3.8–10.5)
WBC # FLD AUTO: 12.88 K/UL — HIGH (ref 3.8–10.5)

## 2020-06-08 PROCEDURE — 80053 COMPREHEN METABOLIC PANEL: CPT

## 2020-06-08 PROCEDURE — 87086 URINE CULTURE/COLONY COUNT: CPT

## 2020-06-08 PROCEDURE — 99284 EMERGENCY DEPT VISIT MOD MDM: CPT

## 2020-06-08 PROCEDURE — 86850 RBC ANTIBODY SCREEN: CPT

## 2020-06-08 PROCEDURE — 87186 SC STD MICRODIL/AGAR DIL: CPT

## 2020-06-08 PROCEDURE — 86900 BLOOD TYPING SEROLOGIC ABO: CPT

## 2020-06-08 PROCEDURE — 76817 TRANSVAGINAL US OBSTETRIC: CPT

## 2020-06-08 PROCEDURE — 99284 EMERGENCY DEPT VISIT MOD MDM: CPT | Mod: 25

## 2020-06-08 PROCEDURE — 76801 OB US < 14 WKS SINGLE FETUS: CPT

## 2020-06-08 PROCEDURE — 76817 TRANSVAGINAL US OBSTETRIC: CPT | Mod: 26

## 2020-06-08 PROCEDURE — 36415 COLL VENOUS BLD VENIPUNCTURE: CPT

## 2020-06-08 PROCEDURE — 85027 COMPLETE CBC AUTOMATED: CPT

## 2020-06-08 PROCEDURE — 76801 OB US < 14 WKS SINGLE FETUS: CPT | Mod: 26

## 2020-06-08 PROCEDURE — 81001 URINALYSIS AUTO W/SCOPE: CPT

## 2020-06-08 PROCEDURE — 86901 BLOOD TYPING SEROLOGIC RH(D): CPT

## 2020-06-08 RX ORDER — CEPHALEXIN 500 MG
500 CAPSULE ORAL ONCE
Refills: 0 | Status: COMPLETED | OUTPATIENT
Start: 2020-06-08 | End: 2020-06-08

## 2020-06-08 RX ORDER — ACETAMINOPHEN 500 MG
650 TABLET ORAL ONCE
Refills: 0 | Status: COMPLETED | OUTPATIENT
Start: 2020-06-08 | End: 2020-06-08

## 2020-06-08 RX ADMIN — Medication 650 MILLIGRAM(S): at 04:57

## 2020-06-08 RX ADMIN — Medication 500 MILLIGRAM(S): at 04:57

## 2020-06-08 NOTE — ED ADULT NURSE NOTE - OBJECTIVE STATEMENT
patient 10 weeks pregnant complaining of urinary symptoms - frequency and urgency for 1 day. patient states urinated with blood at times. denies abd pain, flank pain, n/v/d, fever.

## 2020-06-08 NOTE — ED ADULT NURSE NOTE - CHIEF COMPLAINT QUOTE
Pt presents to ED c/o painful urination. Pt reports pain started yesterday, now noticed her urine appears more pink colored. Denies fever chills, abd pain, NVD. , pt reports she is 3 mos pregnant, OB [facility]

## 2020-06-08 NOTE — ED ADULT TRIAGE NOTE - CHIEF COMPLAINT QUOTE
Pt presents to ED c/o painful urination. Pt reports pain started yesterday, now noticed her urine appears more pink colored. Denies fever chills, abd pain, NVD. , pt reports she is 3 mos pregnant, OB [facility]

## 2020-06-08 NOTE — ED PROVIDER NOTE - NSFOLLOWUPINSTRUCTIONS_ED_ALL_ED_FT
Keflex 500mg every 6 hours   Stay well hydrated  Return to the ER for any further concerns    Urinary Tract Infection    A urinary tract infection (UTI) is an infection of any part of the urinary tract, which includes the kidneys, ureters, bladder, and urethra. Risk factors include ignoring your need to urinate, wiping back to front if female, being an uncircumcised male, and having diabetes or a weak immune system. Symptoms include frequent urination, pain or burning with urination, foul smelling urine, cloudy urine, pain in the lower abdomen, blood in the urine, and fever. If you were prescribed an antibiotic medicine, take it as told by your health care provider. Do not stop taking the antibiotic even if you start to feel better.    SEEK IMMEDIATE MEDICAL CARE IF YOU HAVE ANY OF THE FOLLOWING SYMPTOMS: severe back or abdominal pain, fever, inability to keep fluids or medicine down, dizziness/lightheadedness, or a change in mental status. Keflex 500mg every 6 hours   Stay well hydrated  Return to the ER for any further concerns    Urinary Tract Infection    A urinary tract infection (UTI) is an infection of any part of the urinary tract, which includes the kidneys, ureters, bladder, and urethra. Risk factors include ignoring your need to urinate, wiping back to front if female, being an uncircumcised male, and having diabetes or a weak immune system. Symptoms include frequent urination, pain or burning with urination, foul smelling urine, cloudy urine, pain in the lower abdomen, blood in the urine, and fever. If you were prescribed an antibiotic medicine, take it as told by your health care provider. Do not stop taking the antibiotic even if you start to feel better.    SEEK IMMEDIATE MEDICAL CARE IF YOU HAVE ANY OF THE FOLLOWING SYMPTOMS: severe back or abdominal pain, fever, inability to keep fluids or medicine down, dizziness/lightheadedness, or a change in mental status.      Hematoma subcoriónico  Subchorionic Hematoma     Un hematoma subcoriónico es juan acumulación de william entre la pared externa del embrión (corion) y la pared interna de la matriz (útero).  Esta afección puede causar hemorragia vaginal. Si causan poca o nada de hemorragia vaginal, generalmente, los hematomas pequeños que ocurren al principio del embarazo se reducen por seth propia cuenta y no afectan al bebé ni al embarazo. Cuando la hemorragia comienza más tarde en el embarazo, o el hematoma es más mauricio o se produce en juan paciente de edad avanzada, la afección puede ser más grave. Los hematomas más grandes pueden agrandarse aún más, lo que aumenta las posibilidades de aborto espontáneo. Esta afección también aumenta los siguientes riesgos:  Separación prematura de la placenta del útero.Parto antes de término (prematuro).Muerte fetal.¿Cuáles son las causas?  Se desconoce la causa exacta de esta afección. Ocurre cuando la william queda atrapada entre la placenta y la pared uterina porque la placenta se ha separado del lugar original del implante.  ¿Qué incrementa el riesgo?  Es más probable que desarrolle esta afección si:  Recibió tratamiento con medicamentos para la fertilidad.La leandra se realizó a través de la fertilización in vitro (FIV).¿Cuáles son los signos o los síntomas?  Los síntomas de esta afección incluyen los siguientes:  Pérdida o hemorragia vaginal.Contracciones del útero. Estas contracciones provocan dolor abdominal.En ocasiones, puede no anabella síntomas y la hemorragia solo se puede bro cuando se ta imágenes ecográficas (ecografía transvaginal).  ¿Cómo se diagnostica?  Esta afección se diagnostica con un examen físico. Es un examen pélvico. También pueden hacerle otros estudios, por ejemplo:  Análisis de william.Análisis de orina.Ecografía del abdomen.¿Cómo se trata?  El tratamiento de esta afección puede variar. El tratamiento puede incluir lo siguiente:  Observación cautelosa. La observarán atentamente para detectar cualquier cambio en la hemorragia. Gino esta etapa:  El hematoma puede reabsorberse en el cuerpo.El hematoma puede separar el espacio lleno de líquido que contiene al embrión (saco gestacional) de la pared del útero (endometrio).Medicamentos.Restricción de las actividades. Puede ser necesaria hasta que se detenga la hemorragia.Siga estas indicaciones en seth casa:  Ace reposo en cama si se lo indica el médico.No levante ningún objeto que pese más de 10 libras (4,5 kg) o siga las indicaciones del médico.No consuma ningún producto que contenga nicotina o tabaco, loren cigarrillos y cigarrillos electrónicos. Si necesita ayuda para dejar de fumar, consulte al médico.Lleve un registro escrito de la cantidad de toallas higiénicas que utiliza cada día y cuán empapadas (saturadas) están.No use tampones.Concurra a todas las visitas de control loren se lo haya indicado el médico. [place] es importante. El profesional podrá pedirle que se realice análisis de seguimiento, ecografías o ambas.Comuníquese con un médico si:  Tiene juan hemorragia vaginal.Tiene fiebre.Solicite ayuda de inmediato si:  Siente calambres intensos en el estómago, en la espalda, en el abdomen o en la pelvis.Elimina coágulos o tejidos grandes. Guarde los tejidos para que seth médico los chantell.Tiene más hemorragia vaginal, y se desmaya o se siente mareada o débil.Resumen  Un hematoma subcoriónico es juan acumulación de william entre la pared externa de la placenta y el útero.Esta afección puede causar hemorragia vaginal.En ocasiones, puede no anabella síntomas y la hemorragia solo se puede bro cuando se ta imágenes ecográficas.El tratamiento puede incluir juan observación cautelosa, medicamentos o restricción de las actividades.Esta información no tiene loren fin reemplazar el consejo del médico. Asegúrese de hacerle al médico cualquier pregunta que tenga.

## 2020-06-08 NOTE — ED PROVIDER NOTE - OBJECTIVE STATEMENT
[id number]    21 yo female , 10 weeks pregnant c/o urinary symptoms. Patient c/o urinary frequency and urgency x 1 day.  Pt without abdominal pain or flank pain. no fever.  Pt with blood at times when she wipes.  No cramping.  No fever.

## 2020-06-08 NOTE — ED PROVIDER NOTE - CARE PLAN
Principal Discharge DX:	UTI (urinary tract infection) Principal Discharge DX:	UTI (urinary tract infection)  Secondary Diagnosis:	Subchorionic hematoma

## 2020-06-08 NOTE — ED PROVIDER NOTE - PATIENT PORTAL LINK FT
You can access the FollowMyHealth Patient Portal offered by [facility] by registering at the following website: [email or web address]. By joining [facility]’s FollowMyHealth portal, you will also be able to view your health information using other applications (apps) compatible with our system.

## 2020-06-17 ENCOUNTER — ASOB RESULT (OUTPATIENT)
Age: 20
End: 2020-06-17

## 2020-06-17 ENCOUNTER — APPOINTMENT (OUTPATIENT)
Dept: ANTEPARTUM | Facility: CLINIC | Age: 20
End: 2020-06-17
Payer: MEDICAID

## 2020-06-17 PROCEDURE — 76801 OB US < 14 WKS SINGLE FETUS: CPT

## 2020-06-17 PROCEDURE — 76813 OB US NUCHAL MEAS 1 GEST: CPT

## 2020-06-19 PROBLEM — Z00.00 ENCOUNTER FOR PREVENTIVE HEALTH EXAMINATION: Status: ACTIVE | Noted: 2020-06-19

## 2020-08-18 ENCOUNTER — APPOINTMENT (OUTPATIENT)
Dept: ANTEPARTUM | Facility: CLINIC | Age: 20
End: 2020-08-18
Payer: MEDICAID

## 2020-08-18 ENCOUNTER — OUTPATIENT (OUTPATIENT)
Dept: OUTPATIENT SERVICES | Facility: HOSPITAL | Age: 20
LOS: 1 days | End: 2020-08-18
Payer: MEDICAID

## 2020-08-18 ENCOUNTER — ASOB RESULT (OUTPATIENT)
Age: 20
End: 2020-08-18

## 2020-08-18 DIAGNOSIS — R76.11 NONSPECIFIC REACTION TO TUBERCULIN SKIN TEST WITHOUT ACTIVE TUBERCULOSIS: ICD-10-CM

## 2020-08-18 PROCEDURE — 76811 OB US DETAILED SNGL FETUS: CPT

## 2020-08-18 PROCEDURE — 71045 X-RAY EXAM CHEST 1 VIEW: CPT

## 2020-08-18 PROCEDURE — 71045 X-RAY EXAM CHEST 1 VIEW: CPT | Mod: 26

## 2020-08-19 DIAGNOSIS — R76.11 NONSPECIFIC REACTION TO TUBERCULIN SKIN TEST WITHOUT ACTIVE TUBERCULOSIS: ICD-10-CM

## 2020-09-02 NOTE — ED ADULT TRIAGE NOTE - STATUS:
Applied [Redness] : redness [Muscle Pain] : muscle pain [Negative] : Psychiatric [FreeTextEntry3] : left lower lid stye [FreeTextEntry8] : vaginal spotting x 1 episode [FreeTextEntry9] : LE pain and numbness

## 2020-09-17 ENCOUNTER — OUTPATIENT (OUTPATIENT)
Dept: INPATIENT UNIT | Facility: HOSPITAL | Age: 20
LOS: 1 days | Discharge: HOME | End: 2020-09-17
Payer: MEDICAID

## 2020-09-17 DIAGNOSIS — Z3A.00 WEEKS OF GESTATION OF PREGNANCY NOT SPECIFIED: ICD-10-CM

## 2020-09-17 PROCEDURE — G0463: CPT

## 2020-09-17 PROCEDURE — 84112 EVAL AMNIOTIC FLUID PROTEIN: CPT

## 2020-09-17 PROCEDURE — 59025 FETAL NON-STRESS TEST: CPT

## 2020-09-18 DIAGNOSIS — O47.9 FALSE LABOR, UNSPECIFIED: ICD-10-CM

## 2020-11-17 ENCOUNTER — APPOINTMENT (OUTPATIENT)
Dept: ANTEPARTUM | Facility: CLINIC | Age: 20
End: 2020-11-17
Payer: MEDICAID

## 2020-11-17 ENCOUNTER — ASOB RESULT (OUTPATIENT)
Age: 20
End: 2020-11-17

## 2020-11-17 PROCEDURE — 76816 OB US FOLLOW-UP PER FETUS: CPT

## 2020-11-17 PROCEDURE — 99072 ADDL SUPL MATRL&STAF TM PHE: CPT

## 2020-12-10 NOTE — ED ADULT TRIAGE NOTE - MEANS OF ARRIVAL
ambulatory Is This A New Presentation, Or A Follow-Up?: Skin Lesions How Severe Is Your Skin Lesion?: mild Have Your Skin Lesions Been Treated?: not been treated

## 2020-12-13 ENCOUNTER — OUTPATIENT (OUTPATIENT)
Dept: INPATIENT UNIT | Facility: HOSPITAL | Age: 20
LOS: 1 days | Discharge: HOME | End: 2020-12-13
Payer: MEDICAID

## 2020-12-13 ENCOUNTER — OUTPATIENT (OUTPATIENT)
Dept: INPATIENT UNIT | Facility: HOSPITAL | Age: 20
LOS: 1 days | Discharge: HOME | End: 2020-12-13

## 2020-12-13 DIAGNOSIS — O26.899 OTHER SPECIFIED PREGNANCY RELATED CONDITIONS, UNSPECIFIED TRIMESTER: ICD-10-CM

## 2020-12-13 PROCEDURE — G0463: CPT

## 2020-12-13 PROCEDURE — 59025 FETAL NON-STRESS TEST: CPT

## 2020-12-14 ENCOUNTER — INPATIENT (INPATIENT)
Facility: HOSPITAL | Age: 20
LOS: 2 days | Discharge: HOME | DRG: 560 | End: 2020-12-17
Attending: OBSTETRICS & GYNECOLOGY | Admitting: OBSTETRICS & GYNECOLOGY
Payer: MEDICAID

## 2020-12-14 DIAGNOSIS — O47.9 FALSE LABOR, UNSPECIFIED: ICD-10-CM

## 2020-12-14 DIAGNOSIS — O26.899 OTHER SPECIFIED PREGNANCY RELATED CONDITIONS, UNSPECIFIED TRIMESTER: ICD-10-CM

## 2020-12-14 LAB
BASOPHILS # BLD AUTO: 0.05 K/UL — SIGNIFICANT CHANGE UP (ref 0–0.2)
BASOPHILS NFR BLD AUTO: 0.3 % — SIGNIFICANT CHANGE UP (ref 0–2)
EOSINOPHIL # BLD AUTO: 0.06 K/UL — SIGNIFICANT CHANGE UP (ref 0–0.5)
EOSINOPHIL NFR BLD AUTO: 0.4 % — SIGNIFICANT CHANGE UP (ref 0–6)
HCT VFR BLD CALC: 37.1 % — SIGNIFICANT CHANGE UP (ref 34.5–45)
HGB BLD-MCNC: 12.3 G/DL — SIGNIFICANT CHANGE UP (ref 11.5–15.5)
IMM GRANULOCYTES NFR BLD AUTO: 0.9 % — SIGNIFICANT CHANGE UP (ref 0–1.5)
LYMPHOCYTES # BLD AUTO: 18.5 % — SIGNIFICANT CHANGE UP (ref 13–44)
LYMPHOCYTES # BLD AUTO: 2.76 K/UL — SIGNIFICANT CHANGE UP (ref 1–3.3)
MCHC RBC-ENTMCNC: 31.1 PG — SIGNIFICANT CHANGE UP (ref 27–34)
MCHC RBC-ENTMCNC: 33.2 GM/DL — SIGNIFICANT CHANGE UP (ref 32–36)
MCV RBC AUTO: 93.9 FL — SIGNIFICANT CHANGE UP (ref 80–100)
MONOCYTES # BLD AUTO: 0.84 K/UL — SIGNIFICANT CHANGE UP (ref 0–0.9)
MONOCYTES NFR BLD AUTO: 5.6 % — SIGNIFICANT CHANGE UP (ref 2–14)
NEUTROPHILS # BLD AUTO: 11.04 K/UL — HIGH (ref 1.8–7.4)
NEUTROPHILS NFR BLD AUTO: 74.3 % — SIGNIFICANT CHANGE UP (ref 43–77)
PLATELET # BLD AUTO: 177 K/UL — SIGNIFICANT CHANGE UP (ref 150–400)
RBC # BLD: 3.95 M/UL — SIGNIFICANT CHANGE UP (ref 3.8–5.2)
RBC # FLD: 14.1 % — SIGNIFICANT CHANGE UP (ref 10.3–14.5)
SARS-COV-2 IGG SERPL QL IA: NEGATIVE — SIGNIFICANT CHANGE UP
SARS-COV-2 IGM SERPL IA-ACNC: 0.12 INDEX — SIGNIFICANT CHANGE UP
SARS-COV-2 RNA SPEC QL NAA+PROBE: SIGNIFICANT CHANGE UP
T PALLIDUM AB TITR SER: NEGATIVE — SIGNIFICANT CHANGE UP
WBC # BLD: 14.88 K/UL — HIGH (ref 3.8–10.5)
WBC # FLD AUTO: 14.88 K/UL — HIGH (ref 3.8–10.5)

## 2020-12-14 PROCEDURE — 86850 RBC ANTIBODY SCREEN: CPT

## 2020-12-14 PROCEDURE — G0463: CPT

## 2020-12-14 PROCEDURE — 86900 BLOOD TYPING SEROLOGIC ABO: CPT

## 2020-12-14 PROCEDURE — 94760 N-INVAS EAR/PLS OXIMETRY 1: CPT

## 2020-12-14 PROCEDURE — U0003: CPT

## 2020-12-14 PROCEDURE — 85025 COMPLETE CBC W/AUTO DIFF WBC: CPT

## 2020-12-14 PROCEDURE — 87340 HEPATITIS B SURFACE AG IA: CPT

## 2020-12-14 PROCEDURE — 85018 HEMOGLOBIN: CPT

## 2020-12-14 PROCEDURE — 36415 COLL VENOUS BLD VENIPUNCTURE: CPT

## 2020-12-14 PROCEDURE — 86769 SARS-COV-2 COVID-19 ANTIBODY: CPT

## 2020-12-14 PROCEDURE — 86901 BLOOD TYPING SEROLOGIC RH(D): CPT

## 2020-12-14 PROCEDURE — 86780 TREPONEMA PALLIDUM: CPT

## 2020-12-14 PROCEDURE — 85014 HEMATOCRIT: CPT

## 2020-12-14 PROCEDURE — C1889: CPT

## 2020-12-14 PROCEDURE — 59050 FETAL MONITOR W/REPORT: CPT

## 2020-12-14 RX ORDER — SODIUM CHLORIDE 9 MG/ML
1000 INJECTION, SOLUTION INTRAVENOUS ONCE
Refills: 0 | Status: DISCONTINUED | OUTPATIENT
Start: 2020-12-14 | End: 2020-12-14

## 2020-12-14 RX ORDER — OXYTOCIN 10 UNIT/ML
2 VIAL (ML) INJECTION
Qty: 30 | Refills: 0 | Status: DISCONTINUED | OUTPATIENT
Start: 2020-12-14 | End: 2020-12-17

## 2020-12-14 RX ORDER — SODIUM CHLORIDE 9 MG/ML
1000 INJECTION, SOLUTION INTRAVENOUS
Refills: 0 | Status: DISCONTINUED | OUTPATIENT
Start: 2020-12-14 | End: 2020-12-15

## 2020-12-14 RX ORDER — OXYTOCIN 10 UNIT/ML
333.33 VIAL (ML) INJECTION
Qty: 20 | Refills: 0 | Status: DISCONTINUED | OUTPATIENT
Start: 2020-12-14 | End: 2020-12-17

## 2020-12-14 RX ORDER — CITRIC ACID/SODIUM CITRATE 300-500 MG
30 SOLUTION, ORAL ORAL ONCE
Refills: 0 | Status: DISCONTINUED | OUTPATIENT
Start: 2020-12-14 | End: 2020-12-15

## 2020-12-14 RX ADMIN — SODIUM CHLORIDE 125 MILLILITER(S): 9 INJECTION, SOLUTION INTRAVENOUS at 20:34

## 2020-12-14 RX ADMIN — Medication 2 MILLIUNIT(S)/MIN: at 21:00

## 2020-12-15 VITALS
RESPIRATION RATE: 18 BRPM | HEART RATE: 126 BPM | DIASTOLIC BLOOD PRESSURE: 68 MMHG | SYSTOLIC BLOOD PRESSURE: 129 MMHG | TEMPERATURE: 99 F

## 2020-12-15 LAB — HBV SURFACE AG SERPL QL IA: SIGNIFICANT CHANGE UP

## 2020-12-15 RX ORDER — AER TRAVELER 0.5 G/1
1 SOLUTION RECTAL; TOPICAL EVERY 4 HOURS
Refills: 0 | Status: DISCONTINUED | OUTPATIENT
Start: 2020-12-15 | End: 2020-12-17

## 2020-12-15 RX ORDER — MAGNESIUM HYDROXIDE 400 MG/1
30 TABLET, CHEWABLE ORAL
Refills: 0 | Status: DISCONTINUED | OUTPATIENT
Start: 2020-12-15 | End: 2020-12-17

## 2020-12-15 RX ORDER — OXYTOCIN 10 UNIT/ML
333.33 VIAL (ML) INJECTION
Qty: 20 | Refills: 0 | Status: DISCONTINUED | OUTPATIENT
Start: 2020-12-15 | End: 2020-12-17

## 2020-12-15 RX ORDER — IBUPROFEN 200 MG
600 TABLET ORAL EVERY 6 HOURS
Refills: 0 | Status: COMPLETED | OUTPATIENT
Start: 2020-12-15 | End: 2021-11-13

## 2020-12-15 RX ORDER — IBUPROFEN 200 MG
600 TABLET ORAL EVERY 6 HOURS
Refills: 0 | Status: DISCONTINUED | OUTPATIENT
Start: 2020-12-15 | End: 2020-12-17

## 2020-12-15 RX ORDER — DIBUCAINE 1 %
1 OINTMENT (GRAM) RECTAL EVERY 6 HOURS
Refills: 0 | Status: DISCONTINUED | OUTPATIENT
Start: 2020-12-15 | End: 2020-12-17

## 2020-12-15 RX ORDER — OXYCODONE HYDROCHLORIDE 5 MG/1
5 TABLET ORAL ONCE
Refills: 0 | Status: DISCONTINUED | OUTPATIENT
Start: 2020-12-15 | End: 2020-12-17

## 2020-12-15 RX ORDER — HYDROCORTISONE 1 %
1 OINTMENT (GRAM) TOPICAL EVERY 6 HOURS
Refills: 0 | Status: DISCONTINUED | OUTPATIENT
Start: 2020-12-15 | End: 2020-12-17

## 2020-12-15 RX ORDER — TETANUS TOXOID, REDUCED DIPHTHERIA TOXOID AND ACELLULAR PERTUSSIS VACCINE, ADSORBED 5; 2.5; 8; 8; 2.5 [IU]/.5ML; [IU]/.5ML; UG/.5ML; UG/.5ML; UG/.5ML
0.5 SUSPENSION INTRAMUSCULAR ONCE
Refills: 0 | Status: DISCONTINUED | OUTPATIENT
Start: 2020-12-15 | End: 2020-12-17

## 2020-12-15 RX ORDER — SIMETHICONE 80 MG/1
80 TABLET, CHEWABLE ORAL EVERY 4 HOURS
Refills: 0 | Status: DISCONTINUED | OUTPATIENT
Start: 2020-12-15 | End: 2020-12-17

## 2020-12-15 RX ORDER — DIPHENHYDRAMINE HCL 50 MG
25 CAPSULE ORAL EVERY 6 HOURS
Refills: 0 | Status: DISCONTINUED | OUTPATIENT
Start: 2020-12-15 | End: 2020-12-17

## 2020-12-15 RX ORDER — KETOROLAC TROMETHAMINE 30 MG/ML
30 SYRINGE (ML) INJECTION ONCE
Refills: 0 | Status: DISCONTINUED | OUTPATIENT
Start: 2020-12-15 | End: 2020-12-15

## 2020-12-15 RX ORDER — SODIUM CHLORIDE 9 MG/ML
3 INJECTION INTRAMUSCULAR; INTRAVENOUS; SUBCUTANEOUS EVERY 8 HOURS
Refills: 0 | Status: DISCONTINUED | OUTPATIENT
Start: 2020-12-15 | End: 2020-12-17

## 2020-12-15 RX ORDER — BENZOCAINE 10 %
1 GEL (GRAM) MUCOUS MEMBRANE EVERY 6 HOURS
Refills: 0 | Status: DISCONTINUED | OUTPATIENT
Start: 2020-12-15 | End: 2020-12-17

## 2020-12-15 RX ORDER — ACETAMINOPHEN 500 MG
975 TABLET ORAL
Refills: 0 | Status: DISCONTINUED | OUTPATIENT
Start: 2020-12-15 | End: 2020-12-17

## 2020-12-15 RX ORDER — OXYCODONE HYDROCHLORIDE 5 MG/1
5 TABLET ORAL
Refills: 0 | Status: DISCONTINUED | OUTPATIENT
Start: 2020-12-15 | End: 2020-12-17

## 2020-12-15 RX ORDER — PRAMOXINE HYDROCHLORIDE 150 MG/15G
1 AEROSOL, FOAM RECTAL EVERY 4 HOURS
Refills: 0 | Status: DISCONTINUED | OUTPATIENT
Start: 2020-12-15 | End: 2020-12-17

## 2020-12-15 RX ORDER — LANOLIN
1 OINTMENT (GRAM) TOPICAL EVERY 6 HOURS
Refills: 0 | Status: DISCONTINUED | OUTPATIENT
Start: 2020-12-15 | End: 2020-12-17

## 2020-12-15 RX ADMIN — Medication 600 MILLIGRAM(S): at 21:56

## 2020-12-15 RX ADMIN — Medication 600 MILLIGRAM(S): at 13:35

## 2020-12-15 RX ADMIN — Medication 600 MILLIGRAM(S): at 22:50

## 2020-12-15 RX ADMIN — Medication 975 MILLIGRAM(S): at 11:00

## 2020-12-15 RX ADMIN — Medication 1 APPLICATION(S): at 05:15

## 2020-12-15 RX ADMIN — Medication 30 MILLIGRAM(S): at 04:07

## 2020-12-15 RX ADMIN — Medication 975 MILLIGRAM(S): at 10:00

## 2020-12-15 RX ADMIN — Medication 975 MILLIGRAM(S): at 18:11

## 2020-12-15 RX ADMIN — SODIUM CHLORIDE 3 MILLILITER(S): 9 INJECTION INTRAMUSCULAR; INTRAVENOUS; SUBCUTANEOUS at 14:05

## 2020-12-15 RX ADMIN — Medication 1 TABLET(S): at 10:03

## 2020-12-15 RX ADMIN — Medication 1 SPRAY(S): at 05:15

## 2020-12-15 RX ADMIN — Medication 600 MILLIGRAM(S): at 14:30

## 2020-12-15 NOTE — CHART NOTE - NSCHARTNOTEFT_GEN_A_CORE
Called by RN for patient complaining of b/l heel pain(L>R). Patient seen and examined at bedside. Patient states the pain started yesterday evening after the epidural wore out. States there is No pain at rest, but describes the pain as a sharp, 10/10 burning pain, tender to touch and pressure, worse when she walks on it, making it unbearable. Denies any LE numbness/tingling. On physical exam, pt with good strength with dorsiflexion in LE b/l, sensation intact, trace b/l LE edema, tender to palpation, non-erythematous, no warmth, no lesions noted.     -Given acuity of presentation and location of pain, possibly 2/2 trauma from position of stirrups  -Can apply ice packs to heels b/l, and give PRN Motrin x 1 now  -Recommend outpatient f/u  -RN to call for any significant changes Called by RN for patient complaining of b/l heel pain(L>R). Patient seen and examined at bedside. Patient states the pain started yesterday evening after the epidural wore out. States there is No pain at rest, but describes the pain as a sharp, 10/10 burning pain, tender to touch and pressure, worse when she walks on it, making it unbearable. Denies any LE numbness/tingling. On physical exam, pt with good strength with dorsiflexion in LE b/l, sensation intact, trace b/l LE edema, tender to palpation--localized to heel of feet b/l, non-erythematous, no warmth, no lesions noted.     -Given acuity of presentation and location of pain, possibly 2/2 trauma from position of stirrups  -Can apply ice packs to heels b/l, and give PRN Motrin x 1 now  -Recommend outpatient f/u  -RN to call for any significant changes

## 2020-12-16 ENCOUNTER — TRANSCRIPTION ENCOUNTER (OUTPATIENT)
Age: 20
End: 2020-12-16

## 2020-12-16 LAB
HCT VFR BLD CALC: 30.2 % — LOW (ref 34.5–45)
HGB BLD-MCNC: 9.6 G/DL — LOW (ref 11.5–15.5)

## 2020-12-16 RX ADMIN — Medication 600 MILLIGRAM(S): at 07:25

## 2020-12-16 RX ADMIN — Medication 600 MILLIGRAM(S): at 06:28

## 2020-12-16 RX ADMIN — Medication 600 MILLIGRAM(S): at 12:11

## 2020-12-16 RX ADMIN — Medication 975 MILLIGRAM(S): at 20:52

## 2020-12-16 RX ADMIN — Medication 1 TABLET(S): at 12:10

## 2020-12-16 RX ADMIN — Medication 975 MILLIGRAM(S): at 22:00

## 2020-12-16 RX ADMIN — Medication 600 MILLIGRAM(S): at 18:07

## 2020-12-17 VITALS — OXYGEN SATURATION: 98 % | HEART RATE: 71 BPM | TEMPERATURE: 98 F | RESPIRATION RATE: 18 BRPM

## 2020-12-17 RX ORDER — IBUPROFEN 200 MG
1 TABLET ORAL
Qty: 28 | Refills: 0
Start: 2020-12-17 | End: 2020-12-23

## 2020-12-17 RX ADMIN — Medication 600 MILLIGRAM(S): at 06:19

## 2020-12-17 NOTE — PROGRESS NOTE ADULT - ASSESSMENT
Postpartum Note, , PPD 1   Patient is a 20y woman  who gave birth to a live baby boy, Apgar scores 9 and 9     Assessment and Plan    -s/p  PPD 1.  -Routine post-partum care.  -Pain well controlled, continue current pain regimen.  -Encouraged ambulation.  -Encouraged PO diet/fluids.  -Encouraged breastfeeding.
21 y/o female , Postpartum day 2.   Impression: doing well postpartum, day 2.    Plan: discharge home, follow up in 2 weeks with [facility].  Will use over the counter analgesics as instructed.  Bleeding expectations discussed with patient.

## 2020-12-17 NOTE — DISCHARGE NOTE OB - CARE PLAN
Principal Discharge DX:	 (normal spontaneous vaginal delivery)  Goal:	Healthy mother and baby  Assessment and plan of treatment:	1) Please take ibuprofen as needed for pain.  2) Nothing in the vagina for 6 weeks (including no sex, no tampons, and no douching).  3) No tub baths or pools for 2 weeks. Showers are okay.  4) Please call your doctor for a follow up appointment  5) Please call the office sooner if you have heavy vaginal bleeding, severe abdominal pain, or fever over 100.4F.

## 2020-12-17 NOTE — PROGRESS NOTE ADULT - SUBJECTIVE AND OBJECTIVE BOX
Postpartum Note, , PPD 1   Patient is a 20y woman  who gave birth to a live baby boy, Apgar scores 9 and 9     Subjective: Patient seen and examined at bedside. No acute events overnight. Pain well controlled with current pain regimen. She is ambulating well and tolerating PO diet/fluids. She reports decreasing postpartum bleeding. She is voiding well but reports no BM/flatus. Reports breastfeeding.   Denies fever, headache, changes in vision, chest pain, SOB, nausea, vomiting.   Otherwise, patient feels well without additional complaints.     Physical exam:    Vital Signs Last 24 Hrs  T(C): 36.6 (15 Dec 2020 20:30), Max: 36.8 (15 Dec 2020 08:15)  T(F): 97.8 (15 Dec 2020 20:30), Max: 98.2 (15 Dec 2020 08:15)  HR: 90 (15 Dec 2020 20:30) (76 - 96)  BP: 117/75 (15 Dec 2020 20:30) (112/70 - 117/75)  BP(mean): --  RR: 16 (15 Dec 2020 20:30) (16 - 16)  SpO2: 100% (15 Dec 2020 20:30) (96% - 100%)    Gen: No acute distress, A&Ox3  Cardio: S1,S2, Regular rate and rhythm, no murmurs, rubs or gallops  Lungs: Clear to auscultation B/L, No wheezing, rales or rhonchi  Abdomen: Soft, nontender, no distension, firm uterine fundus at umbilicus.  Ext: No calf tenderness bilaterally    LABS:                        9.6    x     )-----------( x        ( 16 Dec 2020 07:27 )             30.2         Allergies    No Known Allergies    Intolerances      MEDICATIONS  (STANDING):  acetaminophen   Tablet .. 975 milliGRAM(s) Oral <User Schedule>  diphtheria/tetanus/pertussis (acellular) Vaccine (ADAcel) 0.5 milliLiter(s) IntraMuscular once  oxytocin Infusion 333.333 milliUNIT(s)/Min (1000 mL/Hr) IV Continuous <Continuous>  oxytocin Infusion 2 milliUNIT(s)/Min (2 mL/Hr) IV Continuous <Continuous>  oxytocin Infusion 333.333 milliUNIT(s)/Min (1000 mL/Hr) IV Continuous <Continuous>  prenatal multivitamin 1 Tablet(s) Oral daily  sodium chloride 0.9% lock flush 3 milliLiter(s) IV Push every 8 hours    MEDICATIONS  (PRN):  benzocaine 20%/menthol 0.5% Spray 1 Spray(s) Topical every 6 hours PRN for Perineal discomfort  dibucaine 1% Ointment 1 Application(s) Topical every 6 hours PRN Perineal discomfort  diphenhydrAMINE 25 milliGRAM(s) Oral every 6 hours PRN Pruritus  hydrocortisone 1% Cream 1 Application(s) Topical every 6 hours PRN Moderate Pain (4-6)  ibuprofen  Tablet. 600 milliGRAM(s) Oral every 6 hours PRN Temp greater or equal to 38C (100.4F), Mild Pain (1 - 3), Moderate Pain (4 - 6), Severe Pain (7 - 10)  lanolin Ointment 1 Application(s) Topical every 6 hours PRN nipple soreness  magnesium hydroxide Suspension 30 milliLiter(s) Oral two times a day PRN Constipation  oxyCODONE    IR 5 milliGRAM(s) Oral every 3 hours PRN Moderate to Severe Pain (4-10)  oxyCODONE    IR 5 milliGRAM(s) Oral once PRN Moderate to Severe Pain (4-10)  pramoxine 1%/zinc 5% Cream 1 Application(s) Topical every 4 hours PRN Moderate Pain (4-6)  simethicone 80 milliGRAM(s) Chew every 4 hours PRN Gas  witch hazel Pads 1 Application(s) Topical every 4 hours PRN Perineal discomfort                    
21 y/o female , Postpartum day 2.     Patient is doing well, ready to go home today.   Ambulating, voiding, eating well, and pain well controlled.  Lochia is not increased.  Baby is doing well.   No BM or flatus reported.     On exam:    Vital Signs Last 24 Hrs  T(C): 36.9 (16 Dec 2020 19:50), Max: 36.9 (16 Dec 2020 19:50)  T(F): 98.4 (16 Dec 2020 19:50), Max: 98.4 (16 Dec 2020 19:50)  HR: 79 (16 Dec 2020 19:50) (79 - 98)  BP: 117/76 (16 Dec 2020 19:50) (102/65 - 117/76)  BP(mean): --  RR: 18 (16 Dec 2020 19:50) (18 - 19)  SpO2: 98% (16 Dec 2020 19:50) (98% - 98%)      General, appears well, in no distress.  Abdomen: soft, nontender  Uterine fundus: firm and nontender  Extremities: nontender    No Known Allergies      acetaminophen   Tablet .. 975 milliGRAM(s) Oral <User Schedule>  benzocaine 20%/menthol 0.5% Spray 1 Spray(s) Topical every 6 hours PRN  dibucaine 1% Ointment 1 Application(s) Topical every 6 hours PRN  diphenhydrAMINE 25 milliGRAM(s) Oral every 6 hours PRN  diphtheria/tetanus/pertussis (acellular) Vaccine (ADAcel) 0.5 milliLiter(s) IntraMuscular once  hydrocortisone 1% Cream 1 Application(s) Topical every 6 hours PRN  ibuprofen  Tablet. 600 milliGRAM(s) Oral every 6 hours PRN  lanolin Ointment 1 Application(s) Topical every 6 hours PRN  magnesium hydroxide Suspension 30 milliLiter(s) Oral two times a day PRN  oxyCODONE    IR 5 milliGRAM(s) Oral every 3 hours PRN  oxyCODONE    IR 5 milliGRAM(s) Oral once PRN  oxytocin Infusion 333.333 milliUNIT(s)/Min IV Continuous <Continuous>  oxytocin Infusion 2 milliUNIT(s)/Min IV Continuous <Continuous>  oxytocin Infusion 333.333 milliUNIT(s)/Min IV Continuous <Continuous>  pramoxine 1%/zinc 5% Cream 1 Application(s) Topical every 4 hours PRN  prenatal multivitamin 1 Tablet(s) Oral daily  simethicone 80 milliGRAM(s) Chew every 4 hours PRN  sodium chloride 0.9% lock flush 3 milliLiter(s) IV Push every 8 hours  witch hazel Pads 1 Application(s) Topical every 4 hours PRN                                    9.6    x     )-----------( x        ( 16 Dec 2020 07:27 )             30.2     12-14 @ 06:22   Antibody Screen: NEG  Type + Screen: --

## 2020-12-17 NOTE — DISCHARGE NOTE OB - CARE PROVIDER_API CALL
Violet Morales  OBSTETRICS AND GYNECOLOGY  [address] [place] [address]  Phone: [phone]  Fax: [phone]  Follow Up Time:

## 2020-12-17 NOTE — DISCHARGE NOTE OB - MEDICATION SUMMARY - MEDICATIONS TO STOP TAKING
I will STOP taking the medications listed below when I get home from the hospital:    cephalexin 500 mg oral tablet  -- 1 tab(s) by mouth 3 times a day x 7 days  -- Finish all this medication unless otherwise directed by prescriber.    erythromycin 0.5% ophthalmic ointment  -- 1 application in the left eye once a day (at bedtime)   -- For the eye.    Ciloxan 0.3% ophthalmic solution  -- 1 drop(s) in the left eye 4 times a day   -- For the eye.    clindamycin 300 mg oral capsule  -- 1 cap(s) by mouth every 8 hours   -- Finish all this medication unless otherwise directed by prescriber.  Medication should be taken with plenty of water.    oseltamivir 75 mg oral capsule  -- 1 cap(s) by mouth 2 times a day   -- Check with your doctor before becoming pregnant.  Finish all this medication unless otherwise directed by prescriber.    Keflex 500 mg oral capsule  -- 1 cap(s) by mouth 2 times a day   -- Finish all this medication unless otherwise directed by prescriber.

## 2020-12-17 NOTE — DISCHARGE NOTE OB - PATIENT PORTAL LINK FT
You can access the FollowMyHealth Patient Portal offered by [facility] by registering at the following website: [email or web address]. By joining [employer]’s FollowMyHealth portal, you will also be able to view your health information using other applications (apps) compatible with our system.

## 2020-12-17 NOTE — DISCHARGE NOTE OB - HOSPITAL COURSE
s/p uncomplicated  on 12/15. Patient transferred to post partum unit, uncomplicated hospital course. At the time of discharge patient was tolerating regular diet PO, ambulating, voiding, and demonstrating bowel function. Pain well controlled with pain medications PRN.
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
## 2020-12-19 DIAGNOSIS — G89.11 ACUTE PAIN DUE TO TRAUMA: ICD-10-CM
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
## 2020-12-19 DIAGNOSIS — M79.671 PAIN IN RIGHT FOOT: ICD-10-CM

## 2020-12-19 DIAGNOSIS — M79.672 PAIN IN LEFT FOOT: ICD-10-CM
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
## 2020-12-19 DIAGNOSIS — Z3A.38 38 WEEKS GESTATION OF PREGNANCY: ICD-10-CM

## 2021-01-04 NOTE — PATIENT PROFILE OB - PRETERM DELIVERIES, OB PROFILE
0 Isotretinoin Counseling: Patient should get monthly blood tests, not donate blood, not drive at night if vision affected, not share medication, and not undergo elective surgery for 6 months after tx completed. Side effects reviewed, pt to contact office should one occur.

## 2021-06-07 NOTE — ED STATDOCS - NORMAL, MLM
sivakumar all pertinent systems normal Patient Weight (Optional But Required For Cumulative Dose-Numbers And Decimals Only): 114

## 2021-11-25 NOTE — ED ADULT TRIAGE NOTE - TEMPERATURE IN CELSIUS (DEGREES C)
30 y/o F A1 presents to the ED w/ symptoms since yesterday. Likely hyperemesis gravidum. Pt w/ similar symptoms in previous pregnancies. Pt unable to tolerate PO meds, so symptoms persisting. Will symptomatically treat w/ dextrose and normal saline, Reglan for nausea and vomiting and Maalox. Will obtain CBC, CMP, beta hcg and transvaginal ultrasound to confirm fetal heart rate. Will dispo most likely d/c w/ OB/GYN and doxylamine/ pyridoxine. 37.1

## 2022-06-24 NOTE — ED STATDOCS - NS ED MD DISPO DISCHARGE CCDA
Presented to ED c/o head injury s/p trip and fall. (+) head trauma lac noted on right eyebrow. Denies LOC and AC use. Bleeding controlled in triage.
Patient/Caregiver provided printed discharge information.

## 2022-11-30 NOTE — ED ADULT NURSE NOTE - CAS TRG GENERAL AIRWAY, MLM
Workers Compensation INITIAL Office Visit    Employer: [employer]   Date of Injury: 11/17/2022   Subjective      CHIEF COMPLAINT:   Chief Complaint   Patient presents with   • Workers Compensation Follow Up Visit     DOI 11/17/2022 POLYSCIENCE   • Office Visit       HPI:   Sandra Dickson is a 47 year old female, who presents with a complaint of an injury that reportedly occurred during work activities.  She works at  in the position of packaging/shipping.       Mechanism of Injury:  She states that while she was at work on 11/17/2022, she was attempting to move heavy boxes from a skid on a truck and while trying to prevent some boxes from falling she felt a sharp pain in her mid lower back. No numbness/tingling to legs/extremities. No loss of sensation, No bowel or bladder issues. Pain is the same as last visit, she has stopped taking ibuprofen as she feels it wasn't improving her discomfort. She has not been able to get the lidocaine patches prescribed as they were not covered by insurance. She is using home stretches given intermittently.     She denies any other precipitating event or activity.  She has no history of previous problems    ROS:   Review of Systems   Constitutional: Negative.    HENT: Negative.    Eyes: Negative.    Respiratory: Negative.    Cardiovascular: Negative.    Gastrointestinal: Negative.    Endocrine: Negative.    Genitourinary: Negative.    Musculoskeletal: Positive for back pain. Negative for gait problem, joint swelling, neck pain and neck stiffness.   Skin: Negative.    Allergic/Immunologic: Negative.    Neurological: Negative.    Hematological: Negative.    Psychiatric/Behavioral: Negative.         PAST HISTORY:   I have reviewed the patient's medications and allergies, past medical, surgical, social and family history, updating these as appropriate.  See Histories section of the EMR for a display of this information.      PE:   Sandra is a well developed female, who appears in no  acute distress.    She is awake, alert and cooperative and pleasant.   Vitals:    11/30/22 1143   BP: 127/79   Pulse: (!) 53   Resp: 20   Temp: 97.1 °F (36.2 °C)     Physical Exam  Vitals and nursing note reviewed.   Constitutional:       Appearance: She is normal weight.   HENT:      Head: Normocephalic and atraumatic.      Right Ear: External ear normal.      Left Ear: External ear normal.      Nose: Nose normal.      Mouth/Throat:      Mouth: Mucous membranes are moist.   Eyes:      Conjunctiva/sclera: Conjunctivae normal.   Cardiovascular:      Rate and Rhythm: Normal rate and regular rhythm.      Pulses: Normal pulses.      Heart sounds: Normal heart sounds.   Pulmonary:      Effort: Pulmonary effort is normal.      Breath sounds: Normal breath sounds.   Abdominal:      General: Abdomen is flat. Bowel sounds are normal.      Palpations: Abdomen is soft.   Musculoskeletal:      Cervical back: Normal and neck supple.      Thoracic back: Normal.      Lumbar back: No swelling, deformity, spasms or bony tenderness. Negative right straight leg raise test and negative left straight leg raise test.      Comments: Mild right buttock tenderness with palpation. No midline or paraspinal tenderness.   Limited Flexion, restricted by pain.   Full extention, no pain with trunk rotation.    Skin:     General: Skin is warm and dry.      Capillary Refill: Capillary refill takes less than 2 seconds.   Neurological:      Mental Status: She is alert and oriented to person, place, and time.          DIAGNOSIS:   1. Acute right-sided back pain with sciatica        RETURN TO WORK:Employee may return to work with restrictions.                RESTRICTIONS:    No lifting, pushing or pulling over 10 lbs. No climbing or bending. Allow to sit or stand as needed for comfort      TREATMENT PLAN:   Medications for this injury/condition: Ibuprofen 400 mg by mouth 2-3 times per day with food.  Apply lidocaine patch to lower back every 24 hours if  needed for pain.  Remove after 12 hours    Start Physical Therapy  Use Moist Heat: 20 minutes 3 times a day     Referral/Consult:   Physical Therapy  evaluation/treament   Diagnostic Testing: None       Instructions: Start home exercises as previously instructed. Handout given at last visit.         FOLLOW-UP: Return in about 2 weeks (around 12/14/2022).  She is to follow-up sooner if her symptoms should get worse.     See Return to Work Report for further information.    Rhiannon Denise, SYLWIA  11/30/2022   [employer]

## 2023-02-04 ENCOUNTER — EMERGENCY (EMERGENCY)
Facility: HOSPITAL | Age: 23
LOS: 0 days | Discharge: HOME | End: 2023-02-04
Attending: EMERGENCY MEDICINE
Payer: MEDICAID

## 2023-02-04 VITALS
TEMPERATURE: 99 F | HEART RATE: 94 BPM | RESPIRATION RATE: 17 BRPM | OXYGEN SATURATION: 98 % | SYSTOLIC BLOOD PRESSURE: 105 MMHG | DIASTOLIC BLOOD PRESSURE: 67 MMHG

## 2023-02-04 VITALS — HEIGHT: 63 IN | WEIGHT: 128.09 LBS

## 2023-02-04 DIAGNOSIS — R50.9 FEVER, UNSPECIFIED: ICD-10-CM

## 2023-02-04 DIAGNOSIS — Z20.822 CONTACT WITH AND (SUSPECTED) EXPOSURE TO COVID-19: ICD-10-CM

## 2023-02-04 DIAGNOSIS — M79.10 MYALGIA, UNSPECIFIED SITE: ICD-10-CM

## 2023-02-04 DIAGNOSIS — J03.90 ACUTE TONSILLITIS, UNSPECIFIED: ICD-10-CM

## 2023-02-04 DIAGNOSIS — R00.0 TACHYCARDIA, UNSPECIFIED: ICD-10-CM

## 2023-02-04 DIAGNOSIS — J02.9 ACUTE PHARYNGITIS, UNSPECIFIED: ICD-10-CM

## 2023-02-04 LAB
FLUAV AG NPH QL: SIGNIFICANT CHANGE UP
FLUBV AG NPH QL: SIGNIFICANT CHANGE UP
RSV RNA NPH QL NAA+NON-PROBE: SIGNIFICANT CHANGE UP
SARS-COV-2 RNA SPEC QL NAA+PROBE: SIGNIFICANT CHANGE UP

## 2023-02-04 PROCEDURE — 0241U: CPT

## 2023-02-04 PROCEDURE — 96375 TX/PRO/DX INJ NEW DRUG ADDON: CPT

## 2023-02-04 PROCEDURE — 99284 EMERGENCY DEPT VISIT MOD MDM: CPT | Mod: 25

## 2023-02-04 PROCEDURE — 99284 EMERGENCY DEPT VISIT MOD MDM: CPT

## 2023-02-04 PROCEDURE — 87081 CULTURE SCREEN ONLY: CPT

## 2023-02-04 PROCEDURE — 96374 THER/PROPH/DIAG INJ IV PUSH: CPT

## 2023-02-04 RX ORDER — SODIUM CHLORIDE 9 MG/ML
1000 INJECTION INTRAMUSCULAR; INTRAVENOUS; SUBCUTANEOUS ONCE
Refills: 0 | Status: COMPLETED | OUTPATIENT
Start: 2023-02-04 | End: 2023-02-04

## 2023-02-04 RX ORDER — KETOROLAC TROMETHAMINE 30 MG/ML
15 SYRINGE (ML) INJECTION ONCE
Refills: 0 | Status: DISCONTINUED | OUTPATIENT
Start: 2023-02-04 | End: 2023-02-04

## 2023-02-04 RX ORDER — IBUPROFEN 200 MG
1 TABLET ORAL
Qty: 20 | Refills: 0
Start: 2023-02-04

## 2023-02-04 RX ORDER — AMPICILLIN SODIUM AND SULBACTAM SODIUM 250; 125 MG/ML; MG/ML
3 INJECTION, POWDER, FOR SUSPENSION INTRAMUSCULAR; INTRAVENOUS ONCE
Refills: 0 | Status: COMPLETED | OUTPATIENT
Start: 2023-02-04 | End: 2023-02-04

## 2023-02-04 RX ORDER — ACETAMINOPHEN 500 MG
650 TABLET ORAL ONCE
Refills: 0 | Status: COMPLETED | OUTPATIENT
Start: 2023-02-04 | End: 2023-02-04

## 2023-02-04 RX ORDER — ACETAMINOPHEN 500 MG
650 TABLET ORAL ONCE
Refills: 0 | Status: DISCONTINUED | OUTPATIENT
Start: 2023-02-04 | End: 2023-02-04

## 2023-02-04 RX ORDER — DEXAMETHASONE 0.5 MG/5ML
10 ELIXIR ORAL ONCE
Refills: 0 | Status: COMPLETED | OUTPATIENT
Start: 2023-02-04 | End: 2023-02-04

## 2023-02-04 RX ADMIN — Medication 102 MILLIGRAM(S): at 17:32

## 2023-02-04 RX ADMIN — SODIUM CHLORIDE 1000 MILLILITER(S): 9 INJECTION INTRAMUSCULAR; INTRAVENOUS; SUBCUTANEOUS at 19:01

## 2023-02-04 RX ADMIN — Medication 15 MILLIGRAM(S): at 19:22

## 2023-02-04 RX ADMIN — Medication 650 MILLIGRAM(S): at 19:22

## 2023-02-04 RX ADMIN — AMPICILLIN SODIUM AND SULBACTAM SODIUM 200 GRAM(S): 250; 125 INJECTION, POWDER, FOR SUSPENSION INTRAMUSCULAR; INTRAVENOUS at 17:56

## 2023-02-04 RX ADMIN — Medication 650 MILLIGRAM(S): at 17:40

## 2023-02-04 RX ADMIN — Medication 15 MILLIGRAM(S): at 17:32

## 2023-02-04 RX ADMIN — SODIUM CHLORIDE 1000 MILLILITER(S): 9 INJECTION INTRAMUSCULAR; INTRAVENOUS; SUBCUTANEOUS at 17:40

## 2023-02-04 NOTE — ED PROVIDER NOTE - CLINICAL SUMMARY MEDICAL DECISION MAKING FREE TEXT BOX
21 y/o female no PMH presents with 2 days of fever and sore throat, difficult tolerating solid foods, able to tolerate liquids today. Obvious tonsilar swelling, and exudates on exam. Otherwise airways intact and widely patent. Does not appear to be peritonsillar abscess as it is bilateral and without uvular deviation. No trismus or voice changes. Suspect tonsilitis/ pharyngitis. Will need additional pain control and antipyretics, steroids, will treat with empiric antibiotics. Will reassess.

## 2023-02-04 NOTE — ED PROVIDER NOTE - OBJECTIVE STATEMENT
21 y/o female presents to the ED for fever, throat pain, and body aches that began yesterday. Currently only drinking liquids.  Pt has taken 6-7 tablets of Tylenol 350mg since yesterday. Last took 1 tablet of Tylenol 2 hours PTA. Denies cough, abdominal pain, nausea, vomiting, diarrhea, SOB, CP. NKDA 23 y/o female presents to the ED for fever, throat pain, and body aches that began yesterday. Currently only drinking liquids.  Pt has taken 6-7 tablets of Tylenol 325mg since yesterday. Last took 1 tablet of Tylenol 2 hours PTA. Denies cough, abdominal pain, nausea, vomiting, diarrhea, SOB, CP. NKDA

## 2023-02-04 NOTE — ED ADULT TRIAGE NOTE - CHIEF COMPLAINT QUOTE
Pt presents to ED c/o fever and sore throat. states symptoms started last night. endorses swollen lymph nodes

## 2023-02-04 NOTE — ED PROVIDER NOTE - PATIENT PORTAL LINK FT
You can access the FollowMyHealth Patient Portal offered by [facility] by registering at the following website: [email or web address]. By joining [employer]’s FollowMyHealth portal, you will also be able to view your health information using other applications (apps) compatible with our system.

## 2023-02-04 NOTE — ED PROVIDER NOTE - PROGRESS NOTE DETAILS
On reeval after meds, fever resolved, HR improved to 90s. Pt reports feeling much better, tolerating PO. Tonsillar swelling improved on exam. Stable for otpt mngmnt and fu. Pt reports she has had this infection every year for some time, thus should have ENT eval for possible tonsillectomy. Pt understands return precautions

## 2023-02-04 NOTE — ED PROVIDER NOTE - PHYSICAL EXAMINATION
General: AAOx3, NAD  HEENT: NCAT, large right greater than left tonsilar swelling with right sided exudates. Uvula midline and non-swollen. No trismus. Mild b/l anterior cervical adenopathy. No masses or fluctuance in the neck. Neck supple, FROM, no meningismus.   Cardiac: Tachycardic, no murmurs, normal peripheral perfusion  Respiratory: Normal rate and effort. CTAB  GI: Soft, nondistended, nontender  Neuro: No focal deficits. FUNG equally x4, sensation to light touch intact throughout  MSK: FROMx4, no focal bony tenderness, no peripheral edema  Skin: No rash

## 2023-02-04 NOTE — ED ADULT NURSE NOTE - OBJECTIVE STATEMENT
Pt presented to the ER with c/o sore throat, body aches and fever. Pt stated that symptoms started yesterday. Pt took 325mg of Tylenol 2 hrs PTA. Pt denies taking other medications. Pt denies any difficulty swallowing. Pt denies any dizziness, SOB, N/V or CP at this time.

## 2023-02-04 NOTE — ED ADULT NURSE REASSESSMENT NOTE - NS ED NURSE REASSESS COMMENT FT1
patient resting on stretcher comfortably at this time and denies needs, no acute distress observed , call bell at reach and will continue to monitor.

## 2023-02-04 NOTE — ED PROVIDER NOTE - NS ED ROS FT
Constitutional: (+) fever, body aches  ENT: Throat Pain  Cardiac: No chest pain, exertional dyspnea, orthopnea  Respiratory: No shortness of breath, no cough  GI: No abdominal pain, no N/V/D  Neuro: No headaches, no neck pain/stiffness, no numbness  All other systems reviewed and are negative unless otherwise stated in the HPI.

## 2023-02-06 LAB
CULTURE RESULTS: SIGNIFICANT CHANGE UP
SPECIMEN SOURCE: SIGNIFICANT CHANGE UP

## 2025-03-10 NOTE — ED ADULT NURSE NOTE - DRUG PRE-SCREENING (DAST -1)
2/19/2025 Last office visit  Wt Readings from Last 1 Encounters:   02/19/25 89.8 kg (198 lb)        BP Readings from Last 2 Encounters:   02/19/25 130/78   02/17/25 116/80   ]    Lab Results   Component Value Date    SODIUM 139 11/14/2024    POTASSIUM 4.1 11/14/2024    CHLORIDE 107 11/14/2024    CO2 25 11/14/2024    BUN 16 11/14/2024    CREATININE 0.83 11/14/2024    GLUCOSE 108 (H) 11/14/2024     No results found for: \"HGBA1C\"  TSH (mIU/L)   Date Value   03/13/2024 1.10     Lab Results   Component Value Date    CHOLESTEROL 163 03/13/2024    HDL 71 03/13/2024    CALCLDL 78 03/13/2024    TRIGLYCERIDE 65 03/13/2024     Lab Results   Component Value Date    AST 15 11/14/2024    GPT 29 11/14/2024    ALKPT 76 11/14/2024    BILIRUBIN 0.4 11/14/2024        Statement Selected

## 2025-03-31 ENCOUNTER — EMERGENCY (EMERGENCY)
Facility: HOSPITAL | Age: 25
LOS: 0 days | Discharge: HOME | End: 2025-03-31
Attending: EMERGENCY MEDICINE
Payer: COMMERCIAL

## 2025-03-31 VITALS
HEART RATE: 99 BPM | RESPIRATION RATE: 18 BRPM | DIASTOLIC BLOOD PRESSURE: 73 MMHG | TEMPERATURE: 98 F | SYSTOLIC BLOOD PRESSURE: 122 MMHG | OXYGEN SATURATION: 99 %

## 2025-03-31 VITALS — WEIGHT: 136.91 LBS

## 2025-03-31 DIAGNOSIS — G56.01 CARPAL TUNNEL SYNDROME, RIGHT UPPER LIMB: ICD-10-CM

## 2025-03-31 LAB — POCT URINE PREGNANCY TEST: NEGATIVE — SIGNIFICANT CHANGE UP

## 2025-03-31 PROCEDURE — 99284 EMERGENCY DEPT VISIT MOD MDM: CPT

## 2025-03-31 PROCEDURE — 81025 URINE PREGNANCY TEST: CPT

## 2025-03-31 PROCEDURE — 99283 EMERGENCY DEPT VISIT LOW MDM: CPT

## 2025-03-31 RX ORDER — ACETAMINOPHEN 500 MG/5ML
1000 LIQUID (ML) ORAL ONCE
Refills: 0 | Status: COMPLETED | OUTPATIENT
Start: 2025-03-31 | End: 2025-03-31

## 2025-03-31 RX ORDER — IBUPROFEN 200 MG
600 TABLET ORAL ONCE
Refills: 0 | Status: COMPLETED | OUTPATIENT
Start: 2025-03-31 | End: 2025-03-31

## 2025-03-31 RX ADMIN — Medication 1000 MILLIGRAM(S): at 11:07

## 2025-03-31 RX ADMIN — Medication 600 MILLIGRAM(S): at 11:08

## 2025-03-31 NOTE — ED ADULT TRIAGE NOTE - CHIEF COMPLAINT QUOTE
Pt ambulatory to ED w/ right wrist and hand pain since yesterday. States she has some decreased sensation in her 1st 2nd and 3rd digits. Denies trauma or injury.

## 2025-03-31 NOTE — ED ADULT NURSE NOTE - OBJECTIVE STATEMENT
Pt presents to ED c/o right hand pain since yesterday. States pain is worse with movement. Meds given as ordered.

## 2025-03-31 NOTE — ED STATDOCS - PHYSICAL EXAMINATION
Constitutional: NAD AAOx3  Eyes: PERRLA EOMI  Head: Normocephalic atraumatic  Mouth: MMM  Cardiac: regular rate   Resp: unlabored breathing  GI: Abd s/nt/nd  Neuro: grossly normal and intact  Skin: No visible rashes  MSK: Reproducible pain in electric sensation going to the first second and third digits with wrist flexion and compression of the carpal tunnel region Constitutional: NAD AAOx3  Eyes: PERRLA EOMI  Head: Normocephalic atraumatic  Mouth: MMM  Cardiac: regular rate   Resp: unlabored breathing  Neuro: grossly normal and intact  Skin: No visible rashes  MSK: Reproducible pain in electric sensation going to the first second and third digits with wrist flexion and compression of the carpal tunnel region normal pulses no redness warmth swelling

## 2025-03-31 NOTE — ED STATDOCS - PROGRESS NOTE DETAILS
pt seen with ER attending for right wrist pain that has progressively increases and noted to be worse in the morning when waking up. She works as a  denies trauma. she did have previous surgery to the volar aspect of the wrist for tendon repair secondary to laceration from glass.    Exam is consistent with carpal tunnel, pain with flexion, tingling    Will treat with splint, NSAID and ortho hand follow up

## 2025-03-31 NOTE — ED STATDOCS - ATTENDING APP SHARED VISIT CONTRIBUTION OF CARE
I, Alan Johnston MD, personally saw the patient with ACP.  I have personally performed a face to face diagnostic evaluation on this patient.   The initial assessment was performed by myself and then the patient was handed off to the ACP. The patient was followed and re-evaluated by the ACP. All labs, imaging and procedures were evaluated and performed by the ACP and I was available for consultation if any questions in the patients care came up.   I personally made/approved the management plan and take responsibility for the patient management.

## 2025-03-31 NOTE — ED STATDOCS - CLINICAL SUMMARY MEDICAL DECISION MAKING FREE TEXT BOX
24-year-old female presents to the emergency department with right wrist pain.  Patient states that in the past she had surgery on the right wrist because glass cut her tendons.  States that she has not followed up with the hand specialist after the surgery.  States that last night after sleeping she woke up in the morning and had pain over the wrist and feels an electric sensation going down to her first second and third digits.  Pain is improved a little bit since waking up.  No fevers no falls or trauma.  States she does do a lot of lifting at the gym.  Did not take anything for her symptoms.  Exam with normal pulses no redness warmth or swelling normal range of motion of the wrist no signs of infection normal sensation to the digits.  There is reproducible pain in electric sensation going to the first second and third digits with wrist flexion and compression of the carpal tunnel region.  Given symptoms of pain worse after waking up and sensation going to the first second and third digits possibly carpal tunnel syndrome.  No signs of septic joint fracture tenosynovitis.  Will  patient treat symptomatically placed in wrist splint patient to follow-up with hand specialist.

## 2025-03-31 NOTE — ED STATDOCS - NSFOLLOWUPINSTRUCTIONS_ED_ALL_ED_FT
splint  ice for 15 - 20 minutes at a time  ibuprofen 2 tabs every 6 hours  follow up with orthopedic hand doctor   	Levi, Agapito [phone]      Pinzamiento del nervio de la francis (síndrome del túnel carpiano): Qué debe saber  Pinched Nerve in the Wrist (Carpal Tunnel Syndrome): What to Know  Outline of the hand, showing the median nerve and the location of pain in the carpal tunnel.  El pinzamiento del nervio de la francis (síndrome del túnel carpiano o STC) es un problema nervioso que causa dolor, entumecimiento y debilidad en la francis, la mano y los dedos. El túnel carpiano es un espacio estrecho que se encuentra en el lado palmar de la francis.    Los movimientos repetitivos de la francis o determinadas enfermedades pueden causar hinchazón en el túnel. Esta hinchazón puede comprimir el nervio principal de la francis (el nervio mediano).    ¿Cuáles son las causas?  Las causas del STC pueden ser las siguientes:   la mano y la francis juan y otra vez mientras realiza juan tarea.  Lastimarse la francis.  Artritis.  Juan bolsa llena de líquido (quiste) o un crecimiento (tumor) en el túnel carpiano.  Acumulación de líquido richard el embarazo.  Uso de herramientas que vibran.  En algunos casos, se desconoce la causa del STC.    ¿Qué incrementa el riesgo?  Es más probable que tenga el STC si:  Tiene un trabajo en el que deba hacer estas cosas:   la mano con firmeza juan y otra vez.  Trabajar con herramientas que vibran, loren taladros o lijadoras.  Es dena.  Tiene diabetes, obesidad, problemas de tiroides o insuficiencia renal.  ¿Cuáles son los signos o síntomas?  Los síntomas de esta afección incluyen:  Sensación de hormigueo en los dedos. Puede sentir adriana dolor en el pulgar, el dedo índice o el dedo corazón.  Hormigueo o pérdida de la sensibilidad de la mano.  Dolor en todo el brazo. Adriana dolor puede empeorar al flexionar la francis y el codo richard mucho tiempo.  Dolor en la francis que sube por el brazo hasta el hombro.  Dolor que baja hasta la hernandez de la mano o los dedos.  Debilidad en las rosie. Puede resultarle difícil alex y sostener objetos.  Los síntomas pueden empeorar richard la noche.    ¿Cómo se diagnostica?  El STC se diagnostica mediante la historia clínica y un examen físico. También pueden hacerle pruebas y estudios de diagnóstico por imágenes para:  Revisar las señales eléctricas que los nervios les envían a los músculos.  Determinar si las señales eléctricas pasan correctamente por los nervios.  Determinar las posibles causas del STC. Estos incluyen radiografías, ecografía y resonancia magnética (RM).  ¿Cómo se trata?  El STC puede tratarse de las siguientes maneras:  Cambios en el estilo de karolina. Se le pedirá que deje o cambie la actividad que causó el problema.  Fisioterapia. Puede incluir:  Ejercicios que estiran y fortalecen los músculos y tendones de la francis y la mano.  Ejercicios de deslizamiento o movilización de los nervios. Estos ayudan a que los nervios se muevan fluidamente dentro de los tejidos que los rodean.  Terapia ocupacional. Aprenderá a usar la mano nuevamente.  Medicamentos para el dolor y la hinchazón. Es posible que le apliquen inyecciones en la francis.  Juan férula o un dispositivo ortopédico para la francis.  Cirugía.  Siga estas indicaciones en seth casa:  Si tiene juan férula o un dispositivo ortopédico:    Use la férula o el dispositivo ortopédico loren se lo hayan indicado. Quíteselos solo si el médico lo autoriza.  Controle todos los días la piel a seth alrededor. Informe al médico si observa problemas.  Afloje la férula o el dispositivo ortopédico si los dedos se le entumecen, siente hormigueo o se le enfrían y se tornan de color olvin.  Mantenga la férula o el dispositivo ortopédico limpios y secos.  Si la férula o el dispositivo ortopédico no son impermeables:  No deje que se mojen.  Cúbralos para ducharse o bañarse. Use juan cubierta que no permita que entre agua.  Control del dolor, la rigidez y la hinchazón    Bag of ice on a towel on the skin.   Use hielo o juan bolsa de hielo loren se lo hayan indicado.  Si tiene juan férula o un dispositivo ortopédico que se pueden sacar, quíteselos loren se lo hayan indicado.  Coloque juan toalla entre la piel y el hielo.  Coloque el hielo richard 20 minutos, 2 a 3 veces por día.  Si la piel se le pone de color cantu, quite el hielo de inmediato para evitar daños en la piel. El riesgo de daño es mayor si no puede sentir dolor, calor o frío.  Mueva los dedos de la mano con frecuencia para reducir la rigidez y la hinchazón.  Indicaciones generales    Use los medicamentos únicamente según las indicaciones.  Descanse la francis y la mano de cualquier actividad que le cause dolor.  Si la causa del STC es algo que hace en el trabajo, hable con seth empleador sobre hacer cambios. Por ejemplo, es posible que necesite usar juan almohadilla para la francis mientras escribe en el teclado.  Ace ejercicio según las indicaciones. Siga las instrucciones para hacer los ejercicios de deslizamiento o movilización de los nervios. Estos ayudan a que los nervios se muevan fluidamente dentro de los tejidos que los rodean.  Concurra a todas las visitas de seguimiento. [place] es importante.  Dónde obtener más información  [employer] ([employer]): orthoinfo.aaos.org  National [place] of Neurological Disorders and Stroke ([employer] de Trastornos Neurológicos y Accidentes Cerebrovasculares): ninds.nih.gov  Comuníquese con un médico si:  Aparecen nuevos síntomas.  El dolor no se mayelin con los medicamentos.  Los síntomas empeoran.  Solicite ayuda de inmediato si:  La mano o la francis se le adormecen o siente hormigueos, y los síntomas se hacen muy intensos.  Esta información no tiene loren fin reemplazar el consejo del médico. Asegúrese de hacerle al médico cualquier pregunta que tenga.

## 2025-03-31 NOTE — ED STATDOCS - CARE PROVIDER_API CALL
Agapito Sims.  Orthopaedic Surgery  [address] [place] [address]  Phone: [phone]  Fax: [phone]  Follow Up Time: